# Patient Record
Sex: FEMALE | Race: BLACK OR AFRICAN AMERICAN | NOT HISPANIC OR LATINO | Employment: UNEMPLOYED | URBAN - METROPOLITAN AREA
[De-identification: names, ages, dates, MRNs, and addresses within clinical notes are randomized per-mention and may not be internally consistent; named-entity substitution may affect disease eponyms.]

---

## 2018-07-12 ENCOUNTER — HOSPITAL ENCOUNTER (EMERGENCY)
Facility: HOSPITAL | Age: 73
Discharge: HOME | End: 2018-07-12
Attending: EMERGENCY MEDICINE
Payer: MEDICARE

## 2018-07-12 VITALS
SYSTOLIC BLOOD PRESSURE: 190 MMHG | WEIGHT: 150 LBS | OXYGEN SATURATION: 98 % | BODY MASS INDEX: 24.99 KG/M2 | HEART RATE: 85 BPM | TEMPERATURE: 99 F | RESPIRATION RATE: 18 BRPM | HEIGHT: 65 IN | DIASTOLIC BLOOD PRESSURE: 110 MMHG

## 2018-07-12 DIAGNOSIS — Z87.39 HISTORY OF JOINT PAIN: ICD-10-CM

## 2018-07-12 DIAGNOSIS — M19.90 ARTHRITIS: ICD-10-CM

## 2018-07-12 DIAGNOSIS — M25.511 PAIN IN JOINT OF RIGHT SHOULDER: ICD-10-CM

## 2018-07-12 DIAGNOSIS — M25.542 ARTHRALGIA OF BOTH HANDS: Primary | ICD-10-CM

## 2018-07-12 DIAGNOSIS — M25.541 ARTHRALGIA OF BOTH HANDS: Primary | ICD-10-CM

## 2018-07-12 PROCEDURE — 99283 EMERGENCY DEPT VISIT LOW MDM: CPT

## 2018-07-12 PROCEDURE — 99283 EMERGENCY DEPT VISIT LOW MDM: CPT | Mod: ,,, | Performed by: EMERGENCY MEDICINE

## 2018-07-12 RX ORDER — IBUPROFEN 400 MG/1
200 TABLET ORAL EVERY 6 HOURS PRN
Qty: 28 TABLET | Refills: 2 | Status: SHIPPED | OUTPATIENT
Start: 2018-07-12 | End: 2018-07-18 | Stop reason: HOSPADM

## 2018-07-12 RX ORDER — AMLODIPINE BESYLATE 10 MG/1
10 TABLET ORAL DAILY
COMMUNITY
End: 2018-07-18 | Stop reason: SDUPTHER

## 2018-07-12 NOTE — ED PROVIDER NOTES
Encounter Date: 7/12/2018    SCRIBE #1 NOTE: I, Fern Alexander, am scribing for, and in the presence of,  Dr. Daniels. I have scribed the following portions of the note - the Resident attestation.       History     Chief Complaint   Patient presents with    Joint Pain     all my joints are hurting     72 yr old with PMH of HTN and arthritis presented with c/o joint pains all over, most severe in the hands, feet and right shoulder. Presented to [facility] a few weeks back with similar complaints, was told she had arthritis and given meloxicam. Hasn't been evaluated on an out patient basis.  Reports having some memory problems.  No fever/chills/fatigue/chest pain/SOB/  Bowel and bladder habits normal.           Review of patient's allergies indicates:  No Known Allergies  Past Medical History:   Diagnosis Date    Arthritis     Hypertension      Past Surgical History:   Procedure Laterality Date    BUNIONECTOMY       History reviewed. No pertinent family history.  Social History   Substance Use Topics    Smoking status: Never Smoker    Smokeless tobacco: Not on file    Alcohol use No     Review of Systems   Constitutional: Negative for chills, fatigue and fever.   HENT: Negative for trouble swallowing.    Eyes: Negative for visual disturbance.   Respiratory: Negative for cough and shortness of breath.    Cardiovascular: Negative for chest pain.   Gastrointestinal: Negative for abdominal pain.   Genitourinary: Negative for difficulty urinating.   Musculoskeletal: Positive for arthralgias, back pain and joint swelling.   Skin: Negative for color change.   Neurological: Negative for dizziness and light-headedness.   Psychiatric/Behavioral: Negative for agitation and confusion.       Physical Exam     Initial Vitals [07/12/18 1016]   BP Pulse Resp Temp SpO2   (!) 190/110 85 18 99.4 °F (37.4 °C) 98 %      MAP       --         Physical Exam    Nursing note and vitals reviewed.  Constitutional: No distress.   HENT:   Head:  "Normocephalic.   Eyes: Pupils are equal, round, and reactive to light.   Cardiovascular: Normal rate and regular rhythm.   Pulmonary/Chest: Breath sounds normal.   Abdominal: Soft. Bowel sounds are normal.   Musculoskeletal: She exhibits tenderness. She exhibits no edema.   Restricted ROM at right shoulder. Mild tenderness.  Swelling of bilateral MCPs. Mild tenderness, no erythema   Neurological: She is alert and oriented to person, place, and time.         ED Course   Procedures  Labs Reviewed - No data to display       Imaging Results    None          Medical Decision Making:   Initial Assessment:   72 yr old with HTN, ?arthritis presenting with diffuse joint pain. Also reports having memory problems. Has never been evaluated in clinic. Does not have a PCP.  Will need follow up with Internal Medicine, Rheumatology.  Will discharge on low dose NSAIDs and appropriate follow up              Scribe Attestation:   Scribe #1: I performed the above scribed service and the documentation accurately describes the services I performed. I attest to the accuracy of the note.    Attending Attestation:   Physician Attestation Statement for Resident:  As the supervising MD   Physician Attestation Statement: I have personally seen and examined this patient.   I agree with the above history. -: Presents to the ER for arthritis in many joints for "years" and difficulty remembering things for "years." Nothing is new or acute. Here today because she got her medical card. She is 72 and her family members are not aware that she has had Medicare for 7 years. Will refer her to PCP and rheumatology. Nothing acute.     As the supervising MD I agree with the above PE.    As the supervising MD I agree with the above treatment, course, plan, and disposition.  I have reviewed the following: old records at this facility.                       Clinical Impression:   The primary encounter diagnosis was Arthralgia of both hands. Diagnoses of Pain " in joint of right shoulder, Arthritis, and History of joint pain were also pertinent to this visit.                             Simeon Daniels MD  07/16/18 2326

## 2018-07-18 ENCOUNTER — OFFICE VISIT (OUTPATIENT)
Dept: INTERNAL MEDICINE | Facility: CLINIC | Age: 73
End: 2018-07-18
Payer: MEDICARE

## 2018-07-18 ENCOUNTER — LAB VISIT (OUTPATIENT)
Dept: LAB | Facility: HOSPITAL | Age: 73
End: 2018-07-18
Attending: INTERNAL MEDICINE
Payer: MEDICARE

## 2018-07-18 ENCOUNTER — TELEPHONE (OUTPATIENT)
Dept: INTERNAL MEDICINE | Facility: CLINIC | Age: 73
End: 2018-07-18

## 2018-07-18 ENCOUNTER — DOCUMENTATION ONLY (OUTPATIENT)
Dept: INTERNAL MEDICINE | Facility: CLINIC | Age: 73
End: 2018-07-18

## 2018-07-18 VITALS
WEIGHT: 159.19 LBS | DIASTOLIC BLOOD PRESSURE: 100 MMHG | BODY MASS INDEX: 26.52 KG/M2 | HEIGHT: 65 IN | SYSTOLIC BLOOD PRESSURE: 178 MMHG | OXYGEN SATURATION: 98 % | HEART RATE: 74 BPM

## 2018-07-18 DIAGNOSIS — M25.50 ARTHRALGIA, UNSPECIFIED JOINT: ICD-10-CM

## 2018-07-18 DIAGNOSIS — R30.0 DYSURIA: ICD-10-CM

## 2018-07-18 DIAGNOSIS — F32.A DEPRESSION, UNSPECIFIED DEPRESSION TYPE: ICD-10-CM

## 2018-07-18 DIAGNOSIS — M19.90 ARTHRITIS: ICD-10-CM

## 2018-07-18 DIAGNOSIS — I10 HYPERTENSION, UNSPECIFIED TYPE: Primary | ICD-10-CM

## 2018-07-18 DIAGNOSIS — Z12.11 COLON CANCER SCREENING: ICD-10-CM

## 2018-07-18 DIAGNOSIS — F41.9 ANXIETY: ICD-10-CM

## 2018-07-18 DIAGNOSIS — Z12.31 ENCOUNTER FOR SCREENING MAMMOGRAM FOR BREAST CANCER: ICD-10-CM

## 2018-07-18 LAB
BACTERIA #/AREA URNS AUTO: ABNORMAL /HPF
BILIRUB UR QL STRIP: NEGATIVE
CLARITY UR REFRACT.AUTO: CLEAR
COLOR UR AUTO: ABNORMAL
GLUCOSE UR QL STRIP: NEGATIVE
HGB UR QL STRIP: NEGATIVE
KETONES UR QL STRIP: NEGATIVE
LEUKOCYTE ESTERASE UR QL STRIP: ABNORMAL
MICROSCOPIC COMMENT: ABNORMAL
NITRITE UR QL STRIP: NEGATIVE
PH UR STRIP: 8 [PH] (ref 5–8)
PROT UR QL STRIP: NEGATIVE
RBC #/AREA URNS AUTO: 3 /HPF (ref 0–4)
SP GR UR STRIP: 1.01 (ref 1–1.03)
SQUAMOUS #/AREA URNS AUTO: 2 /HPF
URN SPEC COLLECT METH UR: ABNORMAL
UROBILINOGEN UR STRIP-ACNC: NEGATIVE EU/DL
WBC #/AREA URNS AUTO: 4 /HPF (ref 0–5)

## 2018-07-18 PROCEDURE — 99204 OFFICE O/P NEW MOD 45 MIN: CPT | Mod: S$PBB,,, | Performed by: INTERNAL MEDICINE

## 2018-07-18 PROCEDURE — 99999 PR PBB SHADOW E&M-EST. PATIENT-LVL V: CPT | Mod: PBBFAC,,, | Performed by: INTERNAL MEDICINE

## 2018-07-18 PROCEDURE — 90670 PCV13 VACCINE IM: CPT | Mod: PBBFAC

## 2018-07-18 PROCEDURE — 87086 URINE CULTURE/COLONY COUNT: CPT

## 2018-07-18 PROCEDURE — 81001 URINALYSIS AUTO W/SCOPE: CPT

## 2018-07-18 PROCEDURE — 99215 OFFICE O/P EST HI 40 MIN: CPT | Mod: PBBFAC,25 | Performed by: INTERNAL MEDICINE

## 2018-07-18 RX ORDER — AMLODIPINE BESYLATE 10 MG/1
10 TABLET ORAL DAILY
Qty: 90 TABLET | Refills: 3 | Status: SHIPPED | OUTPATIENT
Start: 2018-07-18 | End: 2019-02-19 | Stop reason: SDUPTHER

## 2018-07-18 RX ORDER — SERTRALINE HYDROCHLORIDE 50 MG/1
50 TABLET, FILM COATED ORAL DAILY
Qty: 90 TABLET | Refills: 1 | Status: SHIPPED | OUTPATIENT
Start: 2018-07-18 | End: 2019-02-13 | Stop reason: SDUPTHER

## 2018-07-18 NOTE — PROGRESS NOTES
Subjective:       Patient ID: Tran Bradley is a 72 y.o. female.    Chief Complaint: Follow-up   This is a 72-year-old who presents today for an appointment to establish.  She is brought in by her sister Rachelle.  Her sister reports that she was previously living in [place] not doing a very good job of taking care of herself so she moved to [place] to be with her spinal family members living with her son and her sister Rachelle is taking care of her trying to encourage her to take care of herself and she will be bringing her to her appointments.  She reports has had problems with arthritis and will use an occasional anti-inflammatory or Tylenol for her symptoms mostly in the hands the knees at times will bother her as well.  She has been depressed at times where she sits and stares and does not want to get up and do things her sister relates most of the history but missed  also reports agrees that she has had some depression recently and had been on medication in the past would be agreeable to restarting.  She has history of high blood pressure for which she was taking amlodipine was given to her intermittently by the various ERs but she has not followed up consistently with anyone was awaiting her insurance as well according to her sister.  She has had problems with arthritis uses a walker has had previous foot surgery and some foot deformity which makes it difficult her to get around at times.  Sister reports he has been more fearful of falling as well.  She denies headaches or chest pain has not had a mammogram or other health maintenance in some time either.  She recently went to the ER for her arthritis    HPI  Review of Systems   Constitutional: Negative for fever.   Respiratory: Negative for cough, shortness of breath and wheezing.    Cardiovascular: Negative for chest pain and palpitations.   Gastrointestinal: Negative for abdominal pain and constipation.   Musculoskeletal: Positive for arthralgias.        Hand  "kness mostly    Neurological: Negative for dizziness.       Objective:     Blood pressure (!) 178/100, pulse 74, height 5' 5" (1.651 m), weight 72.2 kg (159 lb 2.8 oz), SpO2 98 %.    Physical Exam   Constitutional: No distress.   HENT:   Head: Normocephalic.   Mouth/Throat: Oropharynx is clear and moist.   Eyes: No scleral icterus.   Neck: Neck supple.   Cardiovascular: Normal rate, regular rhythm and normal heart sounds.  Exam reveals no gallop and no friction rub.    No murmur heard.  Pulmonary/Chest: Effort normal and breath sounds normal. No respiratory distress.   Breast  sebacious cyst left upper breast     Abdominal: Soft. Bowel sounds are normal. She exhibits no mass. There is no tenderness.   Musculoskeletal: She exhibits no edema.   Foot defomrity  Crepitus knees  osteoarhttis bony deformity hands    Neurological: She is alert.   Skin: No erythema.   Psychiatric: She has a normal mood and affect.   Vitals reviewed.      Assessment:       1. Hypertension, unspecified type    2. Encounter for screening mammogram for breast cancer    3. Arthralgia, unspecified joint    4. Colon cancer screening    5. Arthritis    6. Dysuria    7. Depression, unspecified depression type    8. Anxiety        Plan:       Tran was seen today for follow-up.    Diagnoses and all orders for this visit:    Hypertension, unspecified type  Uncontrolled noncompliant encouraged resumption of medication she was previously taking amlodipine at 10 mg I will have her resume this will review her blood work and check renal function etc and consider additional adjustment as needed over time encouraged home blood pressure monitoring low-sodium  -     Basic metabolic panel; Future  -     CBC auto differential; Future  -     Hepatic function panel; Future  -     Lipid panel; Future  -     TSH; Future    Encounter for screening mammogram for breast cancer  -     Mammo Digital Screening Bilat with CAD; Future    Arthralgia, unspecified " joint  Discussed consider rheumatology will schedule levels and review  Await bp control discussed   -     Rheumatoid factor; Future  -     Cyclic citrul peptide antibody, IgG; Future    Colon cancer screening  -     Fecal Immunochemical Test (iFOBT); Future  To start she will consider colonoscopy in the future     Arthritis  More significant in her knees and hands x-rays  She has walker continued fall precuations   Discussed rheumatology appt additional treatment  As indicated await bp control   -     X-ray Knee Ortho Bilateral; Future  -     X-Ray Hand 3 View Bilateral; Future    Dysuria  Intermittent she had a recent UTI treated outlying facility earlier this year will plan to check and review  -     Urinalysis; Future  -     Urine culture; Future      -     amLODIPine (NORVASC) 10 MG tablet; Take 1 tablet (10 mg total) by mouth once daily.    Depression  And anxiety with recent move etc   Plan start   -     sertraline (ZOLOFT) 50 MG tablet; Take 1 tablet (50 mg total) by mouth once daily.    Memory issues consider neruology discussed    prevnar 13 today    Follow up 3-4 weeks bp recheck back on mediation and go from there

## 2018-07-19 LAB — BACTERIA UR CULT: NO GROWTH

## 2018-07-20 ENCOUNTER — TELEPHONE (OUTPATIENT)
Dept: INTERNAL MEDICINE | Facility: CLINIC | Age: 73
End: 2018-07-20

## 2018-07-20 DIAGNOSIS — M15.9 PRIMARY OSTEOARTHRITIS INVOLVING MULTIPLE JOINTS: Primary | ICD-10-CM

## 2018-07-20 NOTE — TELEPHONE ENCOUNTER
Discussed with sister  As requested  Labs acceptable  Lipids up and will consider satin at her follow up   Discussed  She missed xrays and can call to reschedule  She would like to wait until follow up  Rheumatology referral placed number provided to call and schedule  Reached sister awilda  at second listed number   [phone]

## 2018-07-24 ENCOUNTER — LAB VISIT (OUTPATIENT)
Dept: LAB | Facility: HOSPITAL | Age: 73
End: 2018-07-24
Attending: INTERNAL MEDICINE
Payer: MEDICARE

## 2018-07-24 DIAGNOSIS — Z12.11 COLON CANCER SCREENING: ICD-10-CM

## 2018-07-24 LAB — HEMOCCULT STL QL IA: NEGATIVE

## 2018-07-24 PROCEDURE — 82274 ASSAY TEST FOR BLOOD FECAL: CPT

## 2018-08-08 ENCOUNTER — HOSPITAL ENCOUNTER (OUTPATIENT)
Dept: RADIOLOGY | Facility: HOSPITAL | Age: 73
Discharge: HOME | End: 2018-08-08
Attending: INTERNAL MEDICINE
Payer: MEDICARE

## 2018-08-08 DIAGNOSIS — Z12.31 ENCOUNTER FOR SCREENING MAMMOGRAM FOR BREAST CANCER: ICD-10-CM

## 2018-08-08 PROCEDURE — 77067 SCR MAMMO BI INCL CAD: CPT | Mod: 26,,, | Performed by: RADIOLOGY

## 2018-08-08 PROCEDURE — 77067 SCR MAMMO BI INCL CAD: CPT | Mod: TC

## 2018-10-10 ENCOUNTER — HOSPITAL ENCOUNTER (OUTPATIENT)
Dept: RADIOLOGY | Facility: HOSPITAL | Age: 73
Discharge: HOME | End: 2018-10-10
Attending: INTERNAL MEDICINE
Payer: MEDICARE

## 2018-10-10 DIAGNOSIS — M19.90 ARTHRITIS: ICD-10-CM

## 2018-10-10 PROCEDURE — 73130 X-RAY EXAM OF HAND: CPT | Mod: 50,TC

## 2018-10-10 PROCEDURE — 73130 X-RAY EXAM OF HAND: CPT | Mod: 26,59,LT, | Performed by: RADIOLOGY

## 2018-10-10 PROCEDURE — 73562 X-RAY EXAM OF KNEE 3: CPT | Mod: TC,50

## 2018-10-10 PROCEDURE — 73130 X-RAY EXAM OF HAND: CPT | Mod: 26,RT,, | Performed by: RADIOLOGY

## 2018-10-10 PROCEDURE — 73562 X-RAY EXAM OF KNEE 3: CPT | Mod: 26,RT,, | Performed by: RADIOLOGY

## 2018-10-12 ENCOUNTER — TELEPHONE (OUTPATIENT)
Dept: INTERNAL MEDICINE | Facility: CLINIC | Age: 73
End: 2018-10-12

## 2018-10-12 NOTE — TELEPHONE ENCOUNTER
----- Message from Jocelin Vargas MD sent at 10/12/2018  8:55 AM CDT -----  Call and notify pt she had arthrits changes in her knees   And hands and wrists  she should reschedulle her follow up for further evaluation

## 2018-10-12 NOTE — TELEPHONE ENCOUNTER
Spoke w pt son; informed of MD notes. Pt verbalized understanding and will let his Aunt know to schedule follow up appointment for his mother.

## 2018-10-12 NOTE — TELEPHONE ENCOUNTER
She can take tylenol otc   And should follow up as previously recommended  For further evaluation

## 2018-11-14 ENCOUNTER — PATIENT OUTREACH (OUTPATIENT)
Dept: ADMINISTRATIVE | Facility: HOSPITAL | Age: 73
End: 2018-11-14

## 2018-11-14 DIAGNOSIS — Z78.0 ASYMPTOMATIC MENOPAUSAL STATE: ICD-10-CM

## 2018-11-14 DIAGNOSIS — Z13.820 OSTEOPOROSIS SCREENING: Primary | ICD-10-CM

## 2018-11-14 NOTE — PROGRESS NOTES
Spoke with pt's son , reminded of upcoming PCP visit 11-19-18 and need for Dexa Scan ( Scheduled 1-31-19)

## 2018-11-19 ENCOUNTER — OFFICE VISIT (OUTPATIENT)
Dept: INTERNAL MEDICINE | Facility: CLINIC | Age: 73
End: 2018-11-19
Payer: MEDICARE

## 2018-11-19 ENCOUNTER — TELEPHONE (OUTPATIENT)
Dept: INTERNAL MEDICINE | Facility: CLINIC | Age: 73
End: 2018-11-19

## 2018-11-19 ENCOUNTER — OUTPATIENT CASE MANAGEMENT (OUTPATIENT)
Dept: ADMINISTRATIVE | Facility: OTHER | Age: 73
End: 2018-11-19

## 2018-11-19 ENCOUNTER — LAB VISIT (OUTPATIENT)
Dept: LAB | Facility: HOSPITAL | Age: 73
End: 2018-11-19
Attending: INTERNAL MEDICINE
Payer: MEDICARE

## 2018-11-19 VITALS — SYSTOLIC BLOOD PRESSURE: 148 MMHG | HEART RATE: 79 BPM | OXYGEN SATURATION: 99 % | DIASTOLIC BLOOD PRESSURE: 78 MMHG

## 2018-11-19 DIAGNOSIS — N39.9 URINARY DISORDER: ICD-10-CM

## 2018-11-19 DIAGNOSIS — F41.9 ANXIETY: ICD-10-CM

## 2018-11-19 DIAGNOSIS — I10 HYPERTENSION, UNSPECIFIED TYPE: Primary | ICD-10-CM

## 2018-11-19 DIAGNOSIS — M19.90 OSTEOARTHRITIS, UNSPECIFIED OSTEOARTHRITIS TYPE, UNSPECIFIED SITE: ICD-10-CM

## 2018-11-19 DIAGNOSIS — R41.3 MEMORY LOSS: ICD-10-CM

## 2018-11-19 DIAGNOSIS — F32.A DEPRESSION, UNSPECIFIED DEPRESSION TYPE: ICD-10-CM

## 2018-11-19 LAB
BACTERIA #/AREA URNS AUTO: ABNORMAL /HPF
BILIRUB UR QL STRIP: NEGATIVE
CAOX CRY UR QL COMP ASSIST: ABNORMAL
CLARITY UR REFRACT.AUTO: ABNORMAL
COLOR UR AUTO: YELLOW
GLUCOSE UR QL STRIP: NEGATIVE
HGB UR QL STRIP: NEGATIVE
KETONES UR QL STRIP: ABNORMAL
LEUKOCYTE ESTERASE UR QL STRIP: ABNORMAL
MICROSCOPIC COMMENT: ABNORMAL
NITRITE UR QL STRIP: NEGATIVE
PH UR STRIP: 5 [PH] (ref 5–8)
PROT UR QL STRIP: NEGATIVE
RBC #/AREA URNS AUTO: 2 /HPF (ref 0–4)
SP GR UR STRIP: >=1.03 (ref 1–1.03)
SQUAMOUS #/AREA URNS AUTO: 6 /HPF
URN SPEC COLLECT METH UR: ABNORMAL
WBC #/AREA URNS AUTO: 27 /HPF (ref 0–5)

## 2018-11-19 PROCEDURE — 99999 PR PBB SHADOW E&M-EST. PATIENT-LVL IV: CPT | Mod: PBBFAC,,, | Performed by: INTERNAL MEDICINE

## 2018-11-19 PROCEDURE — 90662 IIV NO PRSV INCREASED AG IM: CPT | Mod: PBBFAC

## 2018-11-19 PROCEDURE — 99214 OFFICE O/P EST MOD 30 MIN: CPT | Mod: PBBFAC,25 | Performed by: INTERNAL MEDICINE

## 2018-11-19 PROCEDURE — 99214 OFFICE O/P EST MOD 30 MIN: CPT | Mod: S$PBB,,, | Performed by: INTERNAL MEDICINE

## 2018-11-19 PROCEDURE — 81001 URINALYSIS AUTO W/SCOPE: CPT

## 2018-11-19 RX ORDER — DICLOFENAC SODIUM 10 MG/G
2 GEL TOPICAL DAILY PRN
Qty: 100 G | Refills: 6 | Status: SHIPPED | OUTPATIENT
Start: 2018-11-19

## 2018-11-19 RX ORDER — AMOXICILLIN 500 MG/1
500 TABLET, FILM COATED ORAL 2 TIMES DAILY
Qty: 20 TABLET | Refills: 0 | Status: SHIPPED | OUTPATIENT
Start: 2018-11-19 | End: 2018-11-29

## 2018-11-19 RX ORDER — ROSUVASTATIN CALCIUM 10 MG/1
10 TABLET, COATED ORAL DAILY
Qty: 90 TABLET | Refills: 1 | Status: SHIPPED | OUTPATIENT
Start: 2018-11-19 | End: 2019-02-19 | Stop reason: SDUPTHER

## 2018-11-19 NOTE — TELEPHONE ENCOUNTER
----- Message from Catina Mckeon sent at 11/19/2018 12:17 PM CST -----  Thank you for the referral.  Patient has been assigned to Jocelin Malcolm LMSW for low risk screening for Outpatient Case Management.      Reason for referral: Hypertension, unspecified type  Osteoarthritis, unspecified osteoarthritis type, unspecified site  Memory loss     Please contact [facility] at [phone] with any questions.     Thank you,     Catina Mckeon, [facility]  [phone]

## 2018-11-19 NOTE — PROGRESS NOTES
Subjective:       Patient ID: Tran Bradley is a 72 y.o. female.    Chief Complaint: Follow-up   This is a 72-year-old who presents today for follow-up.  She is brought in by her sister Rachelle who moved her sister to [place] so she could help take care of her she is living with her son but Rachelle reports she has been helping with her appointments and trying to encourage her to take her medications which she has been taking on and off.  Since moving back to [place] she has been concerned about her memory since she has been seeing her more consistently she seems to be depressed at times and has been taking her Zoloft and her amlodipine for her blood pressure but is not taking it on a consistent basis she tries to do a pillbox and other things to help remind her  Reports her son is not tracking her medications but she was not sure how compliant she was previously when she lived in [place].  She was worried that her urine was a bit 0 2 wrist but patient is not complaining of back pain or fevers.  She continues to have difficulty getting around uses a wheelchair walker.  Her knees tend to be giving her the most trouble although she does have some arthritis in her hands and wrists    HPI  Review of Systems   Musculoskeletal:        Arthritis knees    Neurological:        Memory loss       Objective:     Blood pressure (!) 148/78, pulse 79, SpO2 99 %.    Physical Exam   Constitutional: No distress.   HENT:   Head: Normocephalic.   Mouth/Throat: Oropharynx is clear and moist.   Eyes: No scleral icterus.   Neck: Neck supple.   Cardiovascular: Normal rate, regular rhythm and normal heart sounds. Exam reveals no gallop and no friction rub.   No murmur heard.  Pulmonary/Chest: Effort normal and breath sounds normal. No respiratory distress.   Abdominal: Soft. Bowel sounds are normal. She exhibits no mass. There is no tenderness.   Musculoskeletal: She exhibits no edema.   Knees creptisu  Arthritis changes hands     Neurological: She is alert.   Skin: No erythema.   Psychiatric: She has a normal mood and affect.   Vitals reviewed.      Assessment:       1. Hypertension, unspecified type    2. Osteoarthritis, unspecified osteoarthritis type, unspecified site    3. Depression, unspecified depression type    4. Anxiety    5. Memory loss    6. Urinary disorder        Plan:       Tran was seen today for follow-up.    Diagnoses and all orders for this visit:    Hypertension, unspecified type  discussed with patient and family  Recommend use of pillbox and calling or reminding sister maybe needs to assist taking over her medication management to encourage compliance discussed is additional resources for home ADLs sister would like to consider somebody to help bathe her  -     Basic metabolic panel; Future  -     Hepatic function panel; Future  -     Lipid panel; Future  -     Ambulatory referral to Outpatient Case Management    Osteoarthritis, unspecified osteoarthritis type, unspecified site  Discussed continue arthritis in her knees she has been using the Tylenol trial of diclofenac consider ortho possible injection   -     Ambulatory consult to Orthopedics  -     Ambulatory referral to Outpatient Case Management    Depression anxiety, unspecified depression type  Resume Zoloft she has refills plans to get at pharmacy       Memory loss  Pt mms test 20/30 discussed with sister  Who would like neurology appt   -     Ambulatory referral to Neurology  -     Ambulatory referral to Outpatient Case Management    Urinary disorder  Check urine   -     Urinalysis; Future    hyperlipidmia discussed statin strt   -     rosuvastatin (CRESTOR) 10 MG tablet; Take 1 tablet (10 mg total) by mouth once daily.    osteoarthirtis tial   -     diclofenac sodium (VOLTAREN) 1 % Gel; Apply 2 g topically daily as needed.    Flu shot recommended

## 2018-11-19 NOTE — PROGRESS NOTES
Thank you for the referral.  Patient has been assigned to Jocelin Malcolm LMSW for low risk screening for Outpatient Case Management.     Reason for referral: Hypertension, unspecified type  Osteoarthritis, unspecified osteoarthritis type, unspecified site  Memory loss    Please contact [facility] at ext. [phone] with any questions.    Thank you,    Catina Mckeon, [facility]  Outpatient [facility] Mgmt.  [phone]

## 2018-11-20 ENCOUNTER — OUTPATIENT CASE MANAGEMENT (OUTPATIENT)
Dept: ADMINISTRATIVE | Facility: OTHER | Age: 73
End: 2018-11-20

## 2018-11-20 NOTE — TELEPHONE ENCOUNTER
----- Message from Jocelin Malcolm LMSW sent at 11/20/2018  3:57 PM CST -----  This LMSW received a referral on the above patient. This LMSW provided patient/caregiver with the following resource: COA, Medicaid Long Term Criteria, Hired Caregiver List , Senior Resource Guide, [facility], [facility], [facility], [facility] . The resource can be located within [facility] under the Care category.    Thank you for the referral,    Jocelin Malcolm LMSW

## 2018-11-20 NOTE — PROGRESS NOTES
This LMSW received a referral on the above patient.   Reason for referral:Hypertension, unspecified type   Osteoarthritis, unspecified osteoarthritis type, unspecified site   Memory loss   Name of the community resource that was provided:[facility], Medicaid Long Term Criteria, Hired Caregiver List , Senior Resource Guide, [facility], [facility], [facility], [facility]   Resource given to: Patient Caregiver (Sister Rachelle) via [place] Mail and Telephone    This LMSW completed assessment with patient caregiver. Caregiver reports patient resides with her son who assist with shelter. Caregiver reports she assist patient with bathing and additional miscellaneous task. Caregiver reports patient needs assistance with  ADL's and no hired help available nor is patient able to pay for services. LMSW sent a referral to [facility] to assist with bathing and Incontinence supplies. . LMSW also provided caregiver with information on Long Term Medicaid services. This program will assist with additional resources.   Caregiver reports patient ambulates with a walker. Caregiver reports patient may need to be placed in a facility as patient has memory issues. Caregiver reports a referral was placed for neurology for a consult. LMSW encouraged caregiver to follow up with PCP office if she has not heard from neurology within two weeks. Caregiver denied patient needs assistance with food, shelter, medication or medical. Caregiver reports no POA on file, but son makes decision. Caregiver reports no additional support in the home. Caregiver reports patient has two daughters that live in [place] that aren't physically involved in patient care. Caregiver reports she provides transportation to and from appointments. Caregiver reports patient sometimes forgets to take her medication. Caregiver reports a pill box is set up and son usually administer medication, however Caregiver uncertain if patient is  taking medication. LMSW encouraged Caregiver to assist, but caregiver reports she is not available every day of the week. Patient may benefit from pill packing in the future. Caregiver reports she provides transportation to and from appointment. Caregiver open to receiving additional information on Adult Care to assist with day time care. LMSW mailed all resources and provided her contact information for any additional needs.

## 2018-12-06 ENCOUNTER — HOSPITAL ENCOUNTER (EMERGENCY)
Facility: HOSPITAL | Age: 73
Discharge: HOME | End: 2018-12-06
Attending: EMERGENCY MEDICINE
Payer: MEDICARE

## 2018-12-06 VITALS
DIASTOLIC BLOOD PRESSURE: 86 MMHG | SYSTOLIC BLOOD PRESSURE: 125 MMHG | TEMPERATURE: 98 F | OXYGEN SATURATION: 95 % | HEIGHT: 65 IN | RESPIRATION RATE: 18 BRPM | WEIGHT: 140 LBS | HEART RATE: 72 BPM | BODY MASS INDEX: 23.32 KG/M2

## 2018-12-06 DIAGNOSIS — R11.2 NON-INTRACTABLE VOMITING WITH NAUSEA, UNSPECIFIED VOMITING TYPE: ICD-10-CM

## 2018-12-06 DIAGNOSIS — K80.20 GALLSTONES: Primary | ICD-10-CM

## 2018-12-06 LAB
ALBUMIN SERPL BCP-MCNC: 3.9 G/DL
ALP SERPL-CCNC: 86 U/L
ALT SERPL W/O P-5'-P-CCNC: 11 U/L
ANION GAP SERPL CALC-SCNC: 15 MMOL/L
AST SERPL-CCNC: 20 U/L
BACTERIA #/AREA URNS AUTO: NORMAL /HPF
BASOPHILS # BLD AUTO: 0.04 K/UL
BASOPHILS NFR BLD: 0.4 %
BILIRUB SERPL-MCNC: 0.5 MG/DL
BILIRUB UR QL STRIP: NEGATIVE
BUN SERPL-MCNC: 13 MG/DL
CALCIUM SERPL-MCNC: 10 MG/DL
CHLORIDE SERPL-SCNC: 107 MMOL/L
CLARITY UR REFRACT.AUTO: ABNORMAL
CO2 SERPL-SCNC: 19 MMOL/L
COLOR UR AUTO: YELLOW
CREAT SERPL-MCNC: 0.9 MG/DL
DIFFERENTIAL METHOD: ABNORMAL
EOSINOPHIL # BLD AUTO: 0 K/UL
EOSINOPHIL NFR BLD: 0.3 %
ERYTHROCYTE [DISTWIDTH] IN BLOOD BY AUTOMATED COUNT: 13.2 %
EST. GFR  (AFRICAN AMERICAN): >60 ML/MIN/1.73 M^2
EST. GFR  (NON AFRICAN AMERICAN): >60 ML/MIN/1.73 M^2
GLUCOSE SERPL-MCNC: 117 MG/DL
GLUCOSE UR QL STRIP: NEGATIVE
HCT VFR BLD AUTO: 45 %
HGB BLD-MCNC: 14.1 G/DL
HGB UR QL STRIP: NEGATIVE
HYALINE CASTS UR QL AUTO: 1 /LPF
IMM GRANULOCYTES # BLD AUTO: 0.05 K/UL
IMM GRANULOCYTES NFR BLD AUTO: 0.5 %
KETONES UR QL STRIP: ABNORMAL
LEUKOCYTE ESTERASE UR QL STRIP: NEGATIVE
LIPASE SERPL-CCNC: 12 U/L
LYMPHOCYTES # BLD AUTO: 1.4 K/UL
LYMPHOCYTES NFR BLD: 13.6 %
MCH RBC QN AUTO: 26.5 PG
MCHC RBC AUTO-ENTMCNC: 31.3 G/DL
MCV RBC AUTO: 84 FL
MICROSCOPIC COMMENT: NORMAL
MONOCYTES # BLD AUTO: 0.6 K/UL
MONOCYTES NFR BLD: 5.7 %
NEUTROPHILS # BLD AUTO: 8 K/UL
NEUTROPHILS NFR BLD: 79.5 %
NITRITE UR QL STRIP: NEGATIVE
NRBC BLD-RTO: 0 /100 WBC
PH UR STRIP: 5 [PH] (ref 5–8)
PLATELET # BLD AUTO: 288 K/UL
PMV BLD AUTO: 9.8 FL
POTASSIUM SERPL-SCNC: 3.8 MMOL/L
PROT SERPL-MCNC: 8.3 G/DL
PROT UR QL STRIP: NEGATIVE
RBC # BLD AUTO: 5.33 M/UL
SODIUM SERPL-SCNC: 141 MMOL/L
SP GR UR STRIP: 1.02 (ref 1–1.03)
SQUAMOUS #/AREA URNS AUTO: 1 /HPF
TROPONIN I SERPL DL<=0.01 NG/ML-MCNC: <0.006 NG/ML
URN SPEC COLLECT METH UR: ABNORMAL
WBC # BLD AUTO: 10 K/UL
WBC #/AREA URNS AUTO: 1 /HPF (ref 0–5)

## 2018-12-06 PROCEDURE — 85025 COMPLETE CBC W/AUTO DIFF WBC: CPT

## 2018-12-06 PROCEDURE — P9612 CATHETERIZE FOR URINE SPEC: HCPCS

## 2018-12-06 PROCEDURE — 80053 COMPREHEN METABOLIC PANEL: CPT

## 2018-12-06 PROCEDURE — 99284 EMERGENCY DEPT VISIT MOD MDM: CPT | Mod: 25

## 2018-12-06 PROCEDURE — 96360 HYDRATION IV INFUSION INIT: CPT

## 2018-12-06 PROCEDURE — 84484 ASSAY OF TROPONIN QUANT: CPT

## 2018-12-06 PROCEDURE — 25000003 PHARM REV CODE 250: Performed by: EMERGENCY MEDICINE

## 2018-12-06 PROCEDURE — 96361 HYDRATE IV INFUSION ADD-ON: CPT

## 2018-12-06 PROCEDURE — 81001 URINALYSIS AUTO W/SCOPE: CPT

## 2018-12-06 PROCEDURE — 83690 ASSAY OF LIPASE: CPT

## 2018-12-06 PROCEDURE — 99285 EMERGENCY DEPT VISIT HI MDM: CPT | Mod: ,,, | Performed by: EMERGENCY MEDICINE

## 2018-12-06 RX ORDER — ONDANSETRON 4 MG/1
4 TABLET, ORALLY DISINTEGRATING ORAL ONCE
Qty: 16 TABLET | Refills: 0 | Status: SHIPPED | OUTPATIENT
Start: 2018-12-06 | End: 2018-12-06

## 2018-12-06 RX ADMIN — SODIUM CHLORIDE, SODIUM LACTATE, POTASSIUM CHLORIDE, AND CALCIUM CHLORIDE 1000 ML: .6; .31; .03; .02 INJECTION, SOLUTION INTRAVENOUS at 07:12

## 2018-12-07 NOTE — ED PROVIDER NOTES
Encounter Date: 12/6/2018    SCRIBE #1 NOTE: I, Simeon Herrmann, am scribing for, and in the presence of, Gerardo Colon MD.       History     Chief Complaint   Patient presents with    Weakness     generalized weakness, abd pain, N/V x few days     72 year old female with PMHx of anxiety and HTN presents to the ED via EMS with complaint of intermittent epigastric abd pain with associated nausea and vomiting that started one week ago, pain resolved at this time. No exacerbating or mitigating factors. She denies experiencing similar symptoms in the past. Patient endorses difficulty swallowing x 1 week however she denies fever, chills, CP, SOB, recent changes in BM, dysuria, or any other complaints at this time.      The history is provided by the patient.   Abdominal Pain   The current episode started several days ago. The problem has been resolved. The abdominal pain is located in the epigastric region. The abdominal pain does not radiate. The abdominal pain is relieved by nothing. The other symptoms of the illness include nausea and vomiting. The other symptoms of the illness do not include fever, melena, shortness of breath, diarrhea or dysuria.   Symptoms associated with the illness do not include chills, constipation or back pain.     Review of patient's allergies indicates:  No Known Allergies  Past Medical History:   Diagnosis Date    Anxiety     Arthritis     Depression     Hypertension      Past Surgical History:   Procedure Laterality Date    BUNIONECTOMY      bilateral      Family History   Problem Relation Age of Onset    Hypertension Mother     Heart disease Mother     Hypertension Father     Asthma Father     Heart disease Father     Hypertension Sister     COPD Brother     Heart disease Brother      Social History     Tobacco Use    Smoking status: Never Smoker   Substance Use Topics    Alcohol use: No    Drug use: No     Review of Systems   Constitutional: Negative for chills and  fever.   HENT: Positive for trouble swallowing. Negative for sore throat.    Respiratory: Negative for shortness of breath.    Cardiovascular: Negative for chest pain.   Gastrointestinal: Positive for abdominal pain, nausea and vomiting. Negative for constipation, diarrhea and melena.   Genitourinary: Negative for dysuria.   Musculoskeletal: Negative for back pain.   Skin: Negative for rash.   Neurological: Negative for weakness.   Hematological: Does not bruise/bleed easily.       Physical Exam     Initial Vitals [12/06/18 1824]   BP Pulse Resp Temp SpO2   118/70 72 20 98 °F (36.7 °C) 98 %      MAP       --         Physical Exam    Vitals reviewed.  Constitutional: No distress.   72 year old -American female in no acute distress.   HENT:   Head: Normocephalic and atraumatic.   Mouth/Throat: No oral lesions.   Edentulous without acute intraoral injury noted.   Eyes: EOM are normal. Pupils are equal, round, and reactive to light.   Neck: No tracheal deviation present. No JVD present.   Cardiovascular: Normal rate, regular rhythm, normal heart sounds and intact distal pulses. Exam reveals no gallop and no friction rub.    No murmur heard.  Pulmonary/Chest: Breath sounds normal. No stridor. No respiratory distress.   Abdominal: Soft. She exhibits no distension. There is tenderness (mild) in the epigastric area.   Musculoskeletal: Normal range of motion. She exhibits no edema.   Moves all 4 extremities.   Neurological: She is alert and oriented to person, place, and time.   Skin: Skin is warm and dry.   Psychiatric: Her behavior is normal. Thought content normal.         ED Course   Procedures  Labs Reviewed   CBC W/ AUTO DIFFERENTIAL - Abnormal; Notable for the following components:       Result Value    MCH 26.5 (*)     MCHC 31.3 (*)     Gran # (ANC) 8.0 (*)     Immature Grans (Abs) 0.05 (*)     Gran% 79.5 (*)     Lymph% 13.6 (*)     All other components within normal limits   COMPREHENSIVE METABOLIC PANEL -  Abnormal; Notable for the following components:    CO2 19 (*)     Glucose 117 (*)     All other components within normal limits   URINALYSIS, REFLEX TO URINE CULTURE - Abnormal; Notable for the following components:    Appearance, UA Hazy (*)     Ketones, UA 1+ (*)     All other components within normal limits    Narrative:     Preferred Collection Type->Urine, Clean Catch  WHITE TUBE   LIPASE   TROPONIN I   TROPONIN I    Narrative:     add on TROP #[id number] per Gerardo Colon MD @ 20:33  12/06/2018    URINALYSIS MICROSCOPIC    Narrative:     Preferred Collection Type->Urine, Clean Catch  WHITE TUBE     EKG Readings: (Independently Interpreted)   Initial Reading: No STEMI. Rhythm: Normal Sinus Rhythm. Heart Rate: 69. ST Segment Depression: I. T Waves Flipped: V6. Axis: Normal.       Imaging Results          US Abdomen Limited (Final result)     Abnormal  Result time 12/06/18 20:52:27    Final result by Marciano Jacobsen MD (12/06/18 20:52:27)                 Impression:      Cholelithiasis and gallbladder wall thickening with negative sonographic Pathak's sign.  Clinical correlation is needed.  If there is clinical concern for acute cholecystitis, HIDA scan can be considered.    This report was flagged in Epic as abnormal.    Electronically signed by resident: Sunny Cherry  Date:    12/06/2018  Time:    20:38    Electronically signed by: Marciano Jacobsen MD  Date:    12/06/2018  Time:    20:52             Narrative:    EXAMINATION:  US ABDOMEN LIMITED    CLINICAL HISTORY:  upper abdominal pain    TECHNIQUE:  Limited ultrasound of the right upper quadrant of the abdomen (including pancreas, liver, gallbladder, common bile duct, and right kidney) was performed.    COMPARISON:  None.    FINDINGS:  Liver: Normal in size, measuring 16.4 cm. Homogeneous echotexture. No focal hepatic lesions.    Gallbladder: A single 2.6 cm gallstone is seen within the gallbladder.  Assessment of stone mobility is suboptimal due to  patient immobility.  Gallbladder wall is thickened measuring up to 5 mm.  No pericholecystic fluid.  Negative sonographic Pathak sign.    Biliary system: The common duct is not dilated, measuring 4 mm.  No intrahepatic ductal dilatation.    The spleen measures 10.0 x 2.9 cm and is unremarkable.  Visualized portion the pancreas is unremarkable.    Miscellaneous: No upper abdominal ascites.                                 Medical Decision Making:   Differential Diagnosis:   DDx includes but is not limited to: gastroenteritis, biliary colic, acute cholecystitis, CASEY  Independently Interpreted Test(s):   I have ordered and independently interpreted EKG Reading(s) - see prior notes  Clinical Tests:   Lab Tests: Ordered and Reviewed  Radiological Study: Ordered and Reviewed  Medical Tests: Ordered and Reviewed              Attending Attestation:           Physician Attestation for Scribe:      Comments: I, Dr. Gerardo Colon, personally performed the services described in this documentation. All medical record entries made by the scribe were at my direction and in my presence.  I have reviewed the chart and agree that the record reflects my personal performance and is accurate and complete. Gerardo Colon MD.  8:07 PM 12/06/2018      Attending ED Notes:   Ultrasound of the right upper quadrant reveals evidence of gallstones with mildly thickened wall without evidence of tenderness or obstructing stone.  Laboratory evaluation does not reveal evidence of leukocytosis, though does identify a mild granulocytosis in this patient presenting with generalized weakness. Additional laboratory evaluation including troponin x2 and lipase and urinalysis does reveal evidence of a mildly diminished bicarbonate without evidence of solid organ injury or occult pyuria.  The patient is tolerating a regular diet in the ED, and is largely without complaints. She will be discharged home in stable condition with instructions to follow up  with her primary care physician as well as General surgery for further management of fatigue and presence of gallstones.             Clinical Impression:   The primary encounter diagnosis was Gallstones. A diagnosis of Non-intractable vomiting with nausea, unspecified vomiting type was also pertinent to this visit.      Disposition:   Disposition: Discharged  Condition: Stable                        Gerardo Colon MD  12/14/18 8096

## 2018-12-07 NOTE — ED NOTES
Catheterized urine specimen obtained using aseptic technique.  Approximately 10 ml of clear, yellow, free-flowing urine noted.  Pt tolerated well; specimen sent to lab.

## 2018-12-07 NOTE — ED NOTES
Dr Colon at bedside to reassess pt and discuss results and plan of care.  Pt provided with water and crackers for PO trial.

## 2018-12-07 NOTE — ED TRIAGE NOTES
Pt to the ER with complaints of right upper/mid abdominal pain with 4-5 episodes of vomiting that started this evening.

## 2018-12-17 ENCOUNTER — TELEPHONE (OUTPATIENT)
Dept: ORTHOPEDICS | Facility: CLINIC | Age: 73
End: 2018-12-17

## 2018-12-17 ENCOUNTER — OFFICE VISIT (OUTPATIENT)
Dept: ORTHOPEDICS | Facility: CLINIC | Age: 73
End: 2018-12-17
Payer: MEDICARE

## 2018-12-17 DIAGNOSIS — M17.0 PRIMARY OSTEOARTHRITIS OF BOTH KNEES: Primary | ICD-10-CM

## 2018-12-17 PROCEDURE — 20610 DRAIN/INJ JOINT/BURSA W/O US: CPT | Mod: 50,PBBFAC | Performed by: PHYSICIAN ASSISTANT

## 2018-12-17 PROCEDURE — 99999 PR PBB SHADOW E&M-EST. PATIENT-LVL III: CPT | Mod: PBBFAC,,, | Performed by: PHYSICIAN ASSISTANT

## 2018-12-17 PROCEDURE — 20610 DRAIN/INJ JOINT/BURSA W/O US: CPT | Mod: 50,S$PBB,, | Performed by: PHYSICIAN ASSISTANT

## 2018-12-17 PROCEDURE — 99203 OFFICE O/P NEW LOW 30 MIN: CPT | Mod: 25,S$PBB,, | Performed by: PHYSICIAN ASSISTANT

## 2018-12-17 PROCEDURE — 99213 OFFICE O/P EST LOW 20 MIN: CPT | Mod: PBBFAC | Performed by: PHYSICIAN ASSISTANT

## 2018-12-17 RX ORDER — METHYLPREDNISOLONE ACETATE 80 MG/ML
80 INJECTION, SUSPENSION INTRA-ARTICULAR; INTRALESIONAL; INTRAMUSCULAR; SOFT TISSUE
Status: COMPLETED | OUTPATIENT
Start: 2018-12-17 | End: 2018-12-17

## 2018-12-17 RX ADMIN — METHYLPREDNISOLONE ACETATE 80 MG: 80 INJECTION, SUSPENSION INTRALESIONAL; INTRAMUSCULAR; INTRASYNOVIAL; SOFT TISSUE at 04:12

## 2018-12-17 NOTE — TELEPHONE ENCOUNTER
Spoke with sister  Explained I had to cx with Mr Rhodes   She took Mrs Young PAC today @ 3:30 pm  She will inform her sister Tran

## 2018-12-17 NOTE — PROGRESS NOTES
Subjective:      Patient ID: Tran Bradley is a 72 y.o. female.    Chief Complaint: No chief complaint on file.    HPI  72 year old female presents with chief complaint of bilateral knee pain L>R x years. Pain is worse with walking and standing. Voltaren gel gives mild relief. She reports popping. She ambulates with a walker.   Review of Systems   Constitution: Negative for chills, fever and night sweats.   Cardiovascular: Negative for chest pain.   Respiratory: Negative for cough and shortness of breath.    Hematologic/Lymphatic: Does not bruise/bleed easily.   Skin: Negative for color change.   Gastrointestinal: Negative for heartburn.   Genitourinary: Negative for dysuria.   Neurological: Negative for numbness and paresthesias.   Psychiatric/Behavioral: Negative for altered mental status.   Allergic/Immunologic: Negative for persistent infections.         Objective:            General    Vitals reviewed.  Constitutional: She is oriented to person, place, and time. She appears well-developed and well-nourished.   Cardiovascular: Normal rate.    Neurological: She is alert and oriented to person, place, and time.             Left Knee Exam:  ROM 5-90 degrees  +crepitus  No effusion  TTP medial and lateral joint line    Right Knee Exam:  ROM 0-110 degrees  +crepitus  No effusion  TTP medial and lateral joint line      X-ray: reviewed by myself. Bilateral tricompartmental osteoarthritis which is most severe in the left lateral compartment.       Assessment:       Encounter Diagnosis   Name Primary?    Primary osteoarthritis of both knees Yes          Plan:       Discussed treatment options with patient. She would like bilateral cortisone injections. RTC prn.     PROCEDURE:  I have explained the risks, benefits, and alternatives of the procedure in detail.  The patient voices understanding and all questions have been answered.  The patient agrees to proceed as planned. So after I performed a sterile prep of the skin  in the normal fashion the bilateral knee is injected using a 22 gauge needle from the anterolateral approach with a combination of 4cc 1% plain lidocaine and 80 mg of depo medrol.  The patient is cautioned and immediate relief of pain is secondary to the local anesthetic and will be temporary.  After the anesthetic wears off there may be a increase in pain that may last for a few hours or a few days and they should use ice to help alleviate this flair up of pain.

## 2018-12-17 NOTE — LETTER
December 17, 2018      Jocelin Vargas MD  [address]  [facility] [address]           [facility] - Orthopedics  [address]  [facility] [address]  Phone: [phone]          Patient: Tran Bradley   MR Number: [id number]   YOB: 1945   Date of Visit: 12/17/2018       Dear Dr. Jocelin Vargas:    Thank you for referring Tran Bradley to me for evaluation. Attached you will find relevant portions of my assessment and plan of care.    If you have questions, please do not hesitate to call me. I look forward to following Tran Bradley along with you.    Sincerely,    Nadine Young PA-C    Enclosure  CC:  No Recipients    If you would like to receive this communication electronically, please contact [email or web address] or [phone] to request more information on [employer] Link access.    For providers and/or their staff who would like to refer a patient to [facility], please contact us through our one-stop-shop provider referral line, [facility] , at [phone].    If you feel you have received this communication in error or would no longer like to receive these types of communications, please e-mail [email or web address]

## 2019-01-31 ENCOUNTER — HOSPITAL ENCOUNTER (OUTPATIENT)
Dept: RADIOLOGY | Facility: CLINIC | Age: 74
Discharge: HOME | End: 2019-01-31
Attending: INTERNAL MEDICINE
Payer: MEDICARE

## 2019-01-31 DIAGNOSIS — Z78.0 ASYMPTOMATIC MENOPAUSAL STATE: ICD-10-CM

## 2019-01-31 DIAGNOSIS — Z13.820 OSTEOPOROSIS SCREENING: ICD-10-CM

## 2019-01-31 PROCEDURE — 77080 DEXA BONE DENSITY SPINE HIP: ICD-10-PCS | Mod: 26,,, | Performed by: INTERNAL MEDICINE

## 2019-01-31 PROCEDURE — 77080 DXA BONE DENSITY AXIAL: CPT | Mod: 26,,, | Performed by: INTERNAL MEDICINE

## 2019-01-31 PROCEDURE — 77080 DXA BONE DENSITY AXIAL: CPT | Mod: TC,PO

## 2019-02-12 ENCOUNTER — LAB VISIT (OUTPATIENT)
Dept: LAB | Facility: HOSPITAL | Age: 74
End: 2019-02-12
Attending: INTERNAL MEDICINE
Payer: MEDICARE

## 2019-02-12 DIAGNOSIS — I10 HYPERTENSION, UNSPECIFIED TYPE: ICD-10-CM

## 2019-02-12 LAB
ALBUMIN SERPL BCP-MCNC: 3.5 G/DL
ALP SERPL-CCNC: 78 U/L
ALT SERPL W/O P-5'-P-CCNC: 12 U/L
ANION GAP SERPL CALC-SCNC: 8 MMOL/L
AST SERPL-CCNC: 16 U/L
BILIRUB DIRECT SERPL-MCNC: 0.1 MG/DL
BILIRUB SERPL-MCNC: 0.3 MG/DL
BUN SERPL-MCNC: 15 MG/DL
CALCIUM SERPL-MCNC: 9.6 MG/DL
CHLORIDE SERPL-SCNC: 107 MMOL/L
CHOLEST SERPL-MCNC: 267 MG/DL
CHOLEST/HDLC SERPL: 5.1 {RATIO}
CO2 SERPL-SCNC: 27 MMOL/L
CREAT SERPL-MCNC: 0.9 MG/DL
EST. GFR  (AFRICAN AMERICAN): >60 ML/MIN/1.73 M^2
EST. GFR  (NON AFRICAN AMERICAN): >60 ML/MIN/1.73 M^2
GLUCOSE SERPL-MCNC: 104 MG/DL
HDLC SERPL-MCNC: 52 MG/DL
HDLC SERPL: 19.5 %
LDLC SERPL CALC-MCNC: 191.6 MG/DL
NONHDLC SERPL-MCNC: 215 MG/DL
POTASSIUM SERPL-SCNC: 3.9 MMOL/L
PROT SERPL-MCNC: 7.4 G/DL
SODIUM SERPL-SCNC: 142 MMOL/L
TRIGL SERPL-MCNC: 117 MG/DL

## 2019-02-12 PROCEDURE — 36415 COLL VENOUS BLD VENIPUNCTURE: CPT | Mod: PO

## 2019-02-12 PROCEDURE — 80048 BASIC METABOLIC PNL TOTAL CA: CPT

## 2019-02-12 PROCEDURE — 80061 LIPID PANEL: CPT

## 2019-02-12 PROCEDURE — 80076 HEPATIC FUNCTION PANEL: CPT

## 2019-02-13 RX ORDER — SERTRALINE HYDROCHLORIDE 50 MG/1
TABLET, FILM COATED ORAL
Qty: 90 TABLET | Refills: 1 | Status: SHIPPED | OUTPATIENT
Start: 2019-02-13 | End: 2019-02-19 | Stop reason: SDUPTHER

## 2019-02-19 ENCOUNTER — OFFICE VISIT (OUTPATIENT)
Dept: SURGERY | Facility: CLINIC | Age: 74
End: 2019-02-19
Payer: MEDICARE

## 2019-02-19 ENCOUNTER — OFFICE VISIT (OUTPATIENT)
Dept: INTERNAL MEDICINE | Facility: CLINIC | Age: 74
End: 2019-02-19
Payer: MEDICARE

## 2019-02-19 VITALS
SYSTOLIC BLOOD PRESSURE: 128 MMHG | WEIGHT: 140 LBS | DIASTOLIC BLOOD PRESSURE: 68 MMHG | TEMPERATURE: 98 F | BODY MASS INDEX: 21.97 KG/M2 | HEART RATE: 78 BPM | HEIGHT: 67 IN

## 2019-02-19 VITALS
HEIGHT: 67 IN | SYSTOLIC BLOOD PRESSURE: 122 MMHG | DIASTOLIC BLOOD PRESSURE: 62 MMHG | HEART RATE: 88 BPM | BODY MASS INDEX: 21.93 KG/M2

## 2019-02-19 DIAGNOSIS — K80.00 CALCULUS OF GALLBLADDER WITH ACUTE CHOLECYSTITIS WITHOUT OBSTRUCTION: Primary | ICD-10-CM

## 2019-02-19 DIAGNOSIS — M54.9 BACK PAIN, UNSPECIFIED BACK LOCATION, UNSPECIFIED BACK PAIN LATERALITY, UNSPECIFIED CHRONICITY: ICD-10-CM

## 2019-02-19 DIAGNOSIS — R32 URINARY INCONTINENCE, UNSPECIFIED TYPE: ICD-10-CM

## 2019-02-19 DIAGNOSIS — R41.3 MEMORY LOSS: ICD-10-CM

## 2019-02-19 DIAGNOSIS — R26.9 GAIT DISTURBANCE: ICD-10-CM

## 2019-02-19 DIAGNOSIS — K80.20 CALCULUS OF GALLBLADDER WITHOUT CHOLECYSTITIS WITHOUT OBSTRUCTION: ICD-10-CM

## 2019-02-19 DIAGNOSIS — E78.5 HYPERLIPIDEMIA, UNSPECIFIED HYPERLIPIDEMIA TYPE: ICD-10-CM

## 2019-02-19 DIAGNOSIS — I10 HYPERTENSION, UNSPECIFIED TYPE: Primary | ICD-10-CM

## 2019-02-19 DIAGNOSIS — K80.71 CALCULUS OF GALLBLADDER AND BILE DUCT WITH OBSTRUCTION WITHOUT CHOLECYSTITIS: ICD-10-CM

## 2019-02-19 DIAGNOSIS — M19.90 OSTEOARTHRITIS, UNSPECIFIED OSTEOARTHRITIS TYPE, UNSPECIFIED SITE: ICD-10-CM

## 2019-02-19 DIAGNOSIS — F41.9 ANXIETY: ICD-10-CM

## 2019-02-19 PROCEDURE — 99024 PR POST-OP FOLLOW-UP VISIT: ICD-10-PCS | Mod: POP,,, | Performed by: SURGERY

## 2019-02-19 PROCEDURE — 99214 OFFICE O/P EST MOD 30 MIN: CPT | Mod: S$PBB,,, | Performed by: INTERNAL MEDICINE

## 2019-02-19 PROCEDURE — 99024 POSTOP FOLLOW-UP VISIT: CPT | Mod: POP,,, | Performed by: SURGERY

## 2019-02-19 PROCEDURE — 99214 PR OFFICE/OUTPT VISIT, EST, LEVL IV, 30-39 MIN: ICD-10-PCS | Mod: S$PBB,,, | Performed by: INTERNAL MEDICINE

## 2019-02-19 PROCEDURE — 99999 PR PBB SHADOW E&M-EST. PATIENT-LVL IV: ICD-10-PCS | Mod: PBBFAC,,, | Performed by: INTERNAL MEDICINE

## 2019-02-19 PROCEDURE — 99999 PR PBB SHADOW E&M-EST. PATIENT-LVL III: CPT | Mod: PBBFAC,,, | Performed by: SURGERY

## 2019-02-19 PROCEDURE — 99999 PR PBB SHADOW E&M-EST. PATIENT-LVL IV: CPT | Mod: PBBFAC,,, | Performed by: INTERNAL MEDICINE

## 2019-02-19 PROCEDURE — 99214 OFFICE O/P EST MOD 30 MIN: CPT | Mod: PBBFAC,27 | Performed by: INTERNAL MEDICINE

## 2019-02-19 PROCEDURE — 99213 OFFICE O/P EST LOW 20 MIN: CPT | Mod: PBBFAC | Performed by: SURGERY

## 2019-02-19 PROCEDURE — 99999 PR PBB SHADOW E&M-EST. PATIENT-LVL III: ICD-10-PCS | Mod: PBBFAC,,, | Performed by: SURGERY

## 2019-02-19 RX ORDER — AMLODIPINE BESYLATE 10 MG/1
10 TABLET ORAL DAILY
Qty: 90 TABLET | Refills: 3 | Status: SHIPPED | OUTPATIENT
Start: 2019-02-19 | End: 2019-08-21 | Stop reason: SDUPTHER

## 2019-02-19 RX ORDER — SERTRALINE HYDROCHLORIDE 50 MG/1
50 TABLET, FILM COATED ORAL DAILY
Qty: 90 TABLET | Refills: 3 | Status: SHIPPED | OUTPATIENT
Start: 2019-02-19 | End: 2019-08-21 | Stop reason: SDUPTHER

## 2019-02-19 RX ORDER — ROSUVASTATIN CALCIUM 10 MG/1
10 TABLET, COATED ORAL DAILY
Qty: 90 TABLET | Refills: 3 | Status: SHIPPED | OUTPATIENT
Start: 2019-02-19 | End: 2019-08-21 | Stop reason: SDUPTHER

## 2019-02-19 NOTE — LETTER
February 19, 2019      Jocelin Vargas MD  [address]  [place] [address]           [facility] General Surgery  [address]  [facility] [address]  Phone: [phone]          Patient: Tran Bradley   MR Number: [id number]   YOB: 1945   Date of Visit: 2/19/2019       Dear Dr. Jocelin Vargas:    Thank you for referring Tran Bradley to me for evaluation. Attached you will find relevant portions of my assessment and plan of care.    If you have questions, please do not hesitate to call me. I look forward to following Tran Bradley along with you.    Sincerely,    Ema Rollins PA-C    Enclosure  CC:  No Recipients    If you would like to receive this communication electronically, please contact [email or web address] or [phone] to request more information on [employer] Link access.    For providers and/or their staff who would like to refer a patient to [facility], please contact us through our one-stop-shop provider referral line, [facility], at [phone].    If you feel you have received this communication in error or would no longer like to receive these types of communications, please e-mail [email or web address]

## 2019-02-19 NOTE — PROGRESS NOTES
History & Physical    SUBJECTIVE:     History of Present Illness:  Patient is a 73 y.o. female presents with abdominal pain. Onset of symptoms was abrupt starting a few weeks ago with stable course since that time. She had one episode of abdominal pain which prompted a visit to the ER. She was found to have cholelithiasis. Associated symptoms include nausea. Patient denies vomiting, diarrhea. Baseline mildly constipated. Food does not affect her abdominal pain. She denies any other episodes of abdominal pain prior to this attack or since. She does not smoke and does not take any blood thinners.     Chief Complaint   Patient presents with    Consult       Review of patient's allergies indicates:  No Known Allergies    Current Outpatient Medications   Medication Sig Dispense Refill    amLODIPine (NORVASC) 10 MG tablet Take 1 tablet (10 mg total) by mouth once daily. 90 tablet 3    diclofenac sodium (VOLTAREN) 1 % Gel Apply 2 g topically daily as needed. 100 g 6    rosuvastatin (CRESTOR) 10 MG tablet Take 1 tablet (10 mg total) by mouth once daily. 90 tablet 3    sertraline (ZOLOFT) 50 MG tablet Take 1 tablet (50 mg total) by mouth once daily. 90 tablet 3     No current facility-administered medications for this visit.        Past Medical History:   Diagnosis Date    Anxiety     Arthritis     Depression     Hypertension      Past Surgical History:   Procedure Laterality Date    BUNIONECTOMY      bilateral      Family History   Problem Relation Age of Onset    Hypertension Mother     Heart disease Mother     Hypertension Father     Asthma Father     Heart disease Father     Hypertension Sister     COPD Brother     Heart disease Brother      Social History     Tobacco Use    Smoking status: Never Smoker    Smokeless tobacco: Never Used   Substance Use Topics    Alcohol use: No    Drug use: No        Review of Systems:  Review of Systems   Constitutional: Negative for chills and fever.   HENT:  "Negative for congestion and rhinorrhea.    Eyes: Negative for photophobia and visual disturbance.   Respiratory: Negative for cough and shortness of breath.    Cardiovascular: Negative for chest pain and palpitations.   Gastrointestinal: Negative for nausea and vomiting.   Endocrine: Negative for cold intolerance and heat intolerance.   Musculoskeletal: Negative for arthralgias and myalgias.   Skin: Negative for rash and wound.   Allergic/Immunologic: Negative for immunocompromised state.   Neurological: Negative for tremors and weakness.   Hematological: Negative for adenopathy.   Psychiatric/Behavioral: Negative for agitation.       OBJECTIVE:     Vital Signs (Most Recent)  Temp: 98.1 °F (36.7 °C) (02/19/19 1305)  Pulse: 78 (02/19/19 1305)  BP: 128/68 (02/19/19 1305)  5' 7" (1.702 m)  63.5 kg (140 lb)     Physical Exam:  Physical Exam   Constitutional: She is oriented to person, place, and time. She appears well-developed and well-nourished. No distress.   HENT:   Head: Normocephalic and atraumatic.   Eyes: EOM are normal. No scleral icterus.   Neck: Normal range of motion. Neck supple.   Cardiovascular: Normal rate and regular rhythm.   Pulmonary/Chest: Effort normal. No respiratory distress.   Abdominal:   Soft, NTND, no previous abdominal surgical scars noted   Musculoskeletal: Normal range of motion. She exhibits no edema or tenderness.   Neurological: She is alert and oriented to person, place, and time.   Skin: Skin is warm and dry.   Psychiatric: She has a normal mood and affect.       Laboratory  LFTs WNL    Diagnostic Results:  Abd US  Gallbladder: A single 2.6 cm gallstone is seen within the gallbladder.  Assessment of stone mobility is suboptimal due to patient immobility.  Gallbladder wall is thickened measuring up to 5 mm.  No pericholecystic fluid.  Negative sonographic Pathak sign.    ASSESSMENT/PLAN:   74 yo female w cholelithiasis  -discussed risks/benefits of surgical intervention, patient elects " to hold off on surgery at this time  -she will call clinic to schedule surgery if symptoms develop or worsen

## 2019-02-20 NOTE — PROGRESS NOTES
"Subjective:       Patient ID: Tran Bradley is a 73 y.o. female.    Chief Complaint: Follow-up  73 year old pesents for follow up.  She is brought in by her sister who assist with her care and has been trying to hep with her medications  Pt has been a bit better she reports zoloft helping with anxiety and depression . Her arthritis continues to give her troube  She had injection which helped with her knees. She does not have maddy cholesterol medication with her they plan to pick it up and resume medication  She had episode of gi illness went er found gallstones at that time. She has not had symptoms since then    HPI  Review of Systems   Constitutional: Negative for fever.   Respiratory: Negative for cough, shortness of breath and wheezing.    Cardiovascular: Negative for chest pain and palpitations.   Gastrointestinal: Negative for abdominal pain and constipation.   Genitourinary:        Urinary incontinance    Musculoskeletal:        Arthritis knees   Back    Neurological: Negative for dizziness.   Psychiatric/Behavioral:        Memory loss   Depression doing beetter with medication        Objective:     Blood pressure 122/62, pulse 88, height 5' 7" (1.702 m).    Physical Exam   Constitutional: No distress.   HENT:   Head: Normocephalic.   Mouth/Throat: Oropharynx is clear and moist.   Eyes: No scleral icterus.   Neck: Neck supple.   Cardiovascular: Normal rate, regular rhythm and normal heart sounds. Exam reveals no gallop and no friction rub.   No murmur heard.  Pulmonary/Chest: Effort normal and breath sounds normal. No respiratory distress.   Abdominal: Soft. Bowel sounds are normal. She exhibits no mass. There is no tenderness.   Musculoskeletal: She exhibits no edema.   creptisu knees    Neurological: She is alert.   Skin: No erythema.   Vitals reviewed.      Assessment:       1. Hypertension, unspecified type    2. Urinary incontinence, unspecified type    3. Hyperlipidemia, unspecified hyperlipidemia type  "   4. Memory loss    5. Anxiety    6. Calculus of gallbladder and bile duct with obstruction without cholecystitis    7. Gait disturbance    8. Osteoarthritis, unspecified osteoarthritis type, unspecified site    9. Back pain, unspecified back location, unspecified back pain laterality, unspecified chronicity    10. Calculus of gallbladder without cholecystitis without obstruction        Plan:       Tran was seen today for follow-up.    Diagnoses and all orders for this visit:    Hypertension, unspecified type  Has been improve compliance with her bp medications  bp controlled  Sister has been assisting as well     Urinary incontinence, unspecified type  -     Ambulatory referral to Urology    Hyperlipidemia, unspecified hyperlipidemia type    Memory loss  Sister will plan to schedule neruology appt as planned    Anxiety / derpession   continu zoloft improved    Calculus of gallbladder and bile duct with obstruction without cholecystitis  Recent episode of symptoms discussed  Surgical consultation recommended   -     Ambulatory consult to General Surgery    Gait disturbance  Osteoarthritis, unspecified osteoarthritis type, unspecified sit  Back pain, unspecified back location, unspecified back pain laterality, unspecified chronicity  -     Ambulatory consult to Physical Therapy    hyperlipdemia she will resumer her cholesterol medication   -     rosuvastatin (CRESTOR) 10 MG tablet; Take 1 tablet (10 mg total) by mouth once daily.  -     sertraline (ZOLOFT) 50 MG tablet; Take 1 tablet (50 mg total) by mouth once daily.  -     amLODIPine (NORVASC) 10 MG tablet; Take 1 tablet (10 mg total) by mouth once daily.    Follow up 4 months

## 2019-03-18 ENCOUNTER — OFFICE VISIT (OUTPATIENT)
Dept: UROLOGY | Facility: CLINIC | Age: 74
End: 2019-03-18
Payer: MEDICARE

## 2019-03-18 VITALS
BODY MASS INDEX: 24.16 KG/M2 | HEART RATE: 82 BPM | WEIGHT: 145 LBS | DIASTOLIC BLOOD PRESSURE: 73 MMHG | HEIGHT: 65 IN | SYSTOLIC BLOOD PRESSURE: 124 MMHG

## 2019-03-18 DIAGNOSIS — N39.41 URGE INCONTINENCE: Primary | ICD-10-CM

## 2019-03-18 DIAGNOSIS — K59.04 CHRONIC IDIOPATHIC CONSTIPATION: ICD-10-CM

## 2019-03-18 PROCEDURE — 99999 PR PBB SHADOW E&M-EST. PATIENT-LVL IV: CPT | Mod: PBBFAC,,, | Performed by: UROLOGY

## 2019-03-18 PROCEDURE — 99999 PR PBB SHADOW E&M-EST. PATIENT-LVL IV: ICD-10-PCS | Mod: PBBFAC,,, | Performed by: UROLOGY

## 2019-03-18 PROCEDURE — 51701 INSERT BLADDER CATHETER: CPT | Mod: PBBFAC | Performed by: UROLOGY

## 2019-03-18 PROCEDURE — 99205 PR OFFICE/OUTPT VISIT, NEW, LEVL V, 60-74 MIN: ICD-10-PCS | Mod: S$PBB,,, | Performed by: UROLOGY

## 2019-03-18 PROCEDURE — 87086 URINE CULTURE/COLONY COUNT: CPT

## 2019-03-18 PROCEDURE — 99205 OFFICE O/P NEW HI 60 MIN: CPT | Mod: S$PBB,,, | Performed by: UROLOGY

## 2019-03-18 PROCEDURE — 99214 OFFICE O/P EST MOD 30 MIN: CPT | Mod: PBBFAC | Performed by: UROLOGY

## 2019-03-18 NOTE — PROGRESS NOTES
CHIEF COMPLAINT:    Mrs. Bradley is a 73 y.o. female presenting for a consultation at the request of Dr. Jocelin Vargas. Patient presents with urge incontinence.    PRESENTING ILLNESS:    Tran Bradley is a 73 y.o. female who is accompanied by her sister, here for evaluation of urge incontinence.  Most of the history is obtained through her sister as the patient was not very cooperative with her history.  She was cooperative with the exam.  She has had urge incontinence for years.  Her sister recalls that she had to use protection for urinary incontinence 4 years ago.  The patient lives with her son.  She has worn pull ups, unknown how many she goes through.  When asked specific questions, she states that she has hesitancy, feels like she empties well and denies any prolapse symptoms.  She denies recurrent UTI's or gross hematuria.      , uterus in place, not sexually active (no partner), tends to have constipation that she waits to let nature take its course.    REVIEW OF SYSTEMS:    Review of Systems   Constitutional: Negative.    HENT: Negative.    Eyes: Negative.    Respiratory: Negative.    Cardiovascular: Negative.    Gastrointestinal: Positive for constipation.   Genitourinary: Positive for frequency and urgency.   Musculoskeletal: Negative.    Skin: Negative.    Neurological: Negative.    Endo/Heme/Allergies: Negative.    Psychiatric/Behavioral: Positive for depression. The patient is nervous/anxious.        PATIENT HISTORY:    Past Medical History:   Diagnosis Date    Anxiety     Arthritis     Depression     Hypertension        Past Surgical History:   Procedure Laterality Date    BUNIONECTOMY      bilateral        Family History   Problem Relation Age of Onset    Hypertension Mother     Heart disease Mother     Hypertension Father     Asthma Father     Heart disease Father     Hypertension Sister     COPD Brother     Heart disease Brother      Socioeconomic History    Marital status:     Tobacco Use    Smoking status: Never Smoker    Smokeless tobacco: Never Used   Substance and Sexual Activity    Alcohol use: No    Drug use: No    Sexual activity: Not on file       Allergies:  Patient has no known allergies.    Medications:  Outpatient Encounter Medications as of 3/18/2019   Medication Sig Dispense Refill    amLODIPine (NORVASC) 10 MG tablet Take 1 tablet (10 mg total) by mouth once daily. 90 tablet 3    diclofenac sodium (VOLTAREN) 1 % Gel Apply 2 g topically daily as needed. 100 g 6    rosuvastatin (CRESTOR) 10 MG tablet Take 1 tablet (10 mg total) by mouth once daily. 90 tablet 3    sertraline (ZOLOFT) 50 MG tablet Take 1 tablet (50 mg total) by mouth once daily. 90 tablet 3    mirabegron (MYRBETRIQ) 50 mg Tb24 Take 1 tablet (50 mg total) by mouth once daily. 30 tablet 11     No facility-administered encounter medications on file as of 3/18/2019.          PHYSICAL EXAMINATION:    The patient generally appears in good health, is appropriately interactive, and is in no apparent distress.    Skin: No lesions.    Mental: Cooperative with normal affect.    Neuro: Grossly intact.    HEENT: Normal. No evidence of lymphadenopathy.    Chest:  normal inspiratory effort.    Abdomen:  Soft, non-tender. No masses or organomegaly. Bladder is not palpable. No evidence of flank discomfort. No evidence of inguinal hernia.    Extremities: No clubbing, cyanosis, or edema    Normal external female genitalia  Urethral meatus is normal  Urethra and bladder are nontender to bimanual exam  Well supported anteriorly and posteriorly   Uterus and cervix are normal  No adnexal masses  Amount in the bladder by catheterization was 80 ml    LABS:    Lab Results   Component Value Date    BUN 15 02/12/2019    CREATININE 0.9 02/12/2019       IMPRESSION:    Encounter Diagnoses   Name Primary?    Urge incontinence Yes    Chronic idiopathic constipation        PLAN:    1. Myrbetriq prescribed given her memory  loss and chronic constipation  2.  Follow up in 1 month to evaluate response to therapy.  3.  The catheterized specimen was sent for culture

## 2019-03-19 LAB — BACTERIA UR CULT: NO GROWTH

## 2019-03-20 ENCOUNTER — CLINICAL SUPPORT (OUTPATIENT)
Dept: REHABILITATION | Facility: HOSPITAL | Age: 74
End: 2019-03-20
Attending: INTERNAL MEDICINE
Payer: MEDICARE

## 2019-03-20 DIAGNOSIS — R26.81 UNSTEADY GAIT: ICD-10-CM

## 2019-03-20 DIAGNOSIS — M54.50 CHRONIC MIDLINE LOW BACK PAIN WITHOUT SCIATICA: ICD-10-CM

## 2019-03-20 DIAGNOSIS — G89.29 CHRONIC MIDLINE LOW BACK PAIN WITHOUT SCIATICA: ICD-10-CM

## 2019-03-20 DIAGNOSIS — G89.29 CHRONIC PAIN OF BOTH KNEES: ICD-10-CM

## 2019-03-20 DIAGNOSIS — M25.562 CHRONIC PAIN OF BOTH KNEES: ICD-10-CM

## 2019-03-20 DIAGNOSIS — M25.561 CHRONIC PAIN OF BOTH KNEES: ICD-10-CM

## 2019-03-20 PROCEDURE — 97110 THERAPEUTIC EXERCISES: CPT | Mod: PN

## 2019-03-20 PROCEDURE — 97161 PT EVAL LOW COMPLEX 20 MIN: CPT | Mod: PN

## 2019-03-20 NOTE — PROGRESS NOTES
"  [facility]  Physical Therapy Initial Evaluation    Name: Tran Bradley  Clinic Number: [id number]    Therapy Diagnosis:   Encounter Diagnoses   Name Primary?    Chronic pain of both knees     Unsteady gait     Chronic midline low back pain without sciatica      Physician: Jocelin Vargas*    Physician Orders: PT Eval and Treat   Medical Diagnosis from Referral: Gait disturbances; Osteoarthritis, unspecified osteoarthritis type, unspecified site; Back pain, unspecified back location, unspecified back pain laterality, unspecified chronicity;  Evaluation Date: 3/20/2019  Authorization Period Expiration: 2/19/2020  Plan of Care Expiration: 5/20/2019  Visit # / Visits authorized: 1/ 1    Time In: 1250  Time Out: 1345  Total Billable Time: 55 minutes    Precautions: Standard and Fall    Subjective   Date of onset: "Years ago"   History of current condition - Tran and Tran's sister Martita reports: she has been having back and knee pain, reports knee pain is primary. Denies having any surgeries to her LEs. Pt reports bending forward makes her back pain worse. Patient uses hot pack on her back. Pt was using a SPC for walking for "years" but reports that she started using a FWW about a year ago as she was unsteady. Pt sister reports pt has had multiple falls, and had a fall yesterday when getting into her chair. States that pt falls about twice per month. Pt sister also reports that pt has been lying in bed "too much" and does not walk unless she needs to.     Medical History:   Past Medical History:   Diagnosis Date    Anxiety     Arthritis     Depression     Hypertension        Surgical History:   Tran Bradley  has a past surgical history that includes Bunionectomy.    Medications:   Tran has a current medication list which includes the following prescription(s): amlodipine, diclofenac sodium, mirabegron, rosuvastatin, and sertraline.    Allergies:   Review of patient's " allergies indicates:  No Known Allergies     Imaging bone scan films:   Impression: Normal BMD of the lumbar spine and hip.    Prior Therapy: None  Social History: Patient lives with her sister  Occupation: None  Prior Level of Function: Pt was ambulating independently with SPC in home and with SBA in community. Had minimal knee pain with WB activities.  Current Level of Function: Pt currently requiring SBA during gait x100' with FWW, has moderate>severe knee pain with ambulation.    Pain:  Current 0/10, worst 9/10, best 0/10   Location: bilateral knee   Description: Aching and Sharp  Aggravating Factors: Standing and Walking  Easing Factors: pain medication and heating pad    Pts goals: Pt wants to walk better without pain    Objective     Observation: alert and oriented x4    Posture Alignment: slouched posture;forward head;trunk deviated left    Sensation: Pt had difficulty following commands for sensation testing, unable to keep eyes closed and unable to communicate properly when she felt sensation.    DTR: WNL    GAIT DEVIATIONS: Tran displays unsteady gait;decreased step length;antalgic gait;anterior lean;    Knee Range of Motion:    Left degrees Right Degrees   Flexion 110+ 110+     Extension (7) from neutral (5) from neutral      *= pain      Lower Extremity Strength    Right LE  Left LE    Hip flexion: 4/5 Hip flexion: 4/5   Knee extension: 3+/5 Knee extension: 3+/5   Knee flexion: 3+/5 Knee flexion: 3+/5   Hip IR: 4/5 Hip IR: 4/5   Hip ER: 4/5 Hip ER: 4/5   Hip extension:  4/5 Hip extension: 4/5   Hip abduction: 3+/5 Hip abduction: 3+/5   Hip adduction: 5/5 Hip adduction 5/5   Ankle dorsiflexion: 4+/5 Ankle dorsiflexion: 4+/5   Ankle plantarflexion: 3+/5 Ankle plantarflexion: 3+/5     Special Tests:  -ROBYN: negative  -Repeated Flexion: negative  -Repeated Ext:negative    SI provocation test:  Distraction test: negative  Compression test: negative    Neuro Dynamic Testing:    Sciatic nerve:      SLR:  negative   Femoral Nerve:    Femoral nerve test: negative   Neural Tension:     Slump test: negative    Joint Mobility: Pt unable to obtain prone positioning    Palpation: TTP B knees med/lat joint line otherwise WNL    Flexibility:    Popliteal Angle: R = 5 degrees ; L = 0 degrees   Michel's test: R = - ; L = -   Chao test: R = + ; L = +    Balance:  Rhomberg EO: 15secs  Rhomberg EC: 0secs    TINETTI BALANCE ASSESSMENT TOOL    Davidsonetti ME, Tony TF, Rose Mary R, Fall Risk Index for elderly patients based on number of chronic disabilities.Am J Med [id number]      BALANCE SECTION  Patient is seated in hard, armless chair;    1.Sitting Balance:   Steady; safe = 1  2.Rises from chair :  Able, uses arms to help = 1  3.Attempts to arise :  Able, requirese > 1 attempt = 1  4.Immediate standing Balance (first 5 seconds) : Steady but uses walker or other support = 1  5.Standing balance: Unsteady = 0  6.Nudged : Begins to fall = 0  7.Eyes closed: Unsteady = 0  8.Turning 360 degrees:  Discontinuous steps = 0 and Unsteady (grabs, staggers) = 0  9.Sitting Down : Unsafe (misjudged distance, falls into chair) = 0    Balance Score:  4/16    GAIT SECTION  Patient stands with therapist, walks across room (+/- aids), first at usual pace, then at rapid pace.    10.Initiation of Gait (Immediately after told to go.): No hesitancy = 1  11.Step length and height :  Step to=0  12.Foot Clearance :  Foot drop=0   13.Step symmetry: Right & Left step length appear equal = 1  14.Step continuity : Stooping or discontinuity between steps = 0  15.Path: Mild/moderate deviation or uses walking aid = 1  16.Trunk : Marked sway or uses walking aid = 0  17.Walking Time: Heels apart = 0    Gait Score: 3/12  Balance score:7/16  Total Score=Balance + Gait Score: 10/28     Risk Indicators:    Tinetti Tool Score   Risk of Falls   ?18  "   High   19-23   Moderate   ?24   Low    -------------------------------------------------------------------------------------------------------------------------------------    PT Evaluation Completed? Yes  Discussed Plan of Care with patient: Yes        TREATMENT   Treatment Time In: 1330  Treatment Time Out: 1345  Total Treatment time separate from Evaluation: 15 minutes    Tran received therapeutic exercises to develop strength, ROM and flexibility for 15 minutes including:  HS stretch with strap 2x20" BLE  BLE SLR x10   BLE LAQ x10  Patient required maximum cues to perform correctly and continued to have difficulty, was unable to perform correctly after max cues and education, will cont to require education and guidance with exercises.    Home Exercises and Patient Education Provided    Education provided:   - Pt educated on pathology of OA and purpose of intended treatments in PT  - Pt educated on proper gait form and posture  - Educated on importance of continued mobility    Written Home Exercises Provided: yes.  Exercises were reviewed and Tran was able to demonstrate them prior to the end of the session.  Tran demonstrated poor understanding of the education provided.     See EMR under Patient Instructions for exercises provided 3/20/2019.    Assessment   Tran is a 73 y.o. female referred to outpatient Physical Therapy with a medical diagnosis of unspecified arthritis with gait disturbances. Pt presents with c/o B knee and low back pain, B knee pain is primary complaint. Pt had significant difficulty following commands and appropriately answering questions throughout evaluation despite being oriented and having no mental impairments. Evaluation was performed with pt sister present, pt and sister argued consistently t/o evaluation and pt seems to cooperate less when sister is arguing with her. Pt demonstrating B knee pain during evaluation, PT was unable to reproduce pt back pain during " evaluation. Pt reports LBP is worsened when she bends over however repeated flexion in seated position did not reproduce pain. Pt knee pain increases with WB activities such as standing or walking. Pt ambulates with slow unsteady gait with FWW, requires SBA during gait with FWW, CGA during standing balance without AD. Pt was given HEP but had significant diffiulty performing correctly even with maximum cues, exercise intensity was held to a minimum for HEP in order to promote pt compliance as pt sister does not believe she will be able to perform exercises at home. Overall pt demonstrates pain, decreased LE strength, decreased flexibility, impaired gait/mobility, decreased endurance, and impaired balance.     Pt prognosis is Good.   Pt will benefit from skilled outpatient Physical Therapy to address the deficits stated above and in the chart below, provide pt/family education, and to maximize pt's level of independence.     Plan of care discussed with patient: Yes  Pt's spiritual, cultural and educational needs considered and patient is agreeable to the plan of care and goals as stated below:     Anticipated Barriers for therapy: None    Medical Necessity is demonstrated by the following  History  Co-morbidities and personal factors that may impact the plan of care Co-morbidities:   anxiety, depression and HTN    Personal Factors:   no deficits     low   Examination  Body Structures and Functions, activity limitations and participation restrictions that may impact the plan of care Body Regions:   back  lower extremities    Body Systems:    gross symmetry  ROM  strength  gross coordinated movement  balance  gait  transfers  motor control    Participation Restrictions:   Walking and going out of home    Activity limitations:   Learning and applying knowledge  no deficits    General Tasks and Commands  no deficits    Communication  no deficits    Mobility  lifting and carrying objects  walking  using transportation  (bus, train, plane, car)  driving (bike, car, motorcycle)    Self care  washing oneself (bathing, drying, washing hands)  caring for body parts (brushing teeth, shaving, grooming)  dressing    Domestic Life  shopping  doing house work (cleaning house, washing dishes, laundry)    Interactions/Relationships  no deficits    Life Areas  no deficits    Community and Social Life  no deficits         Complexity: low  See FOTO outcome assessment   Clinical Presentation stable and uncomplicated low   Decision Making/ Complexity Score: low     GOALS: Short Term Goals:  4 weeks  1. Report decreased in pain at worse less than  <   / =  4  /10  to increase tolerance for functional activities  2. Pt to increase B popliteal angle by greater than > or = 5 degrees in order to improve flexibility and posture.   3. Increased B knee ext MMT 1/2  to increase tolerance for ADL and work activities.  4. Pt to tolerate HEP to improve ROM and independence with ADL's  5. Pt to improve range of motion by 25% to allow for improved functional mobility to allow for improvement in IADLs.   6. Pt will demonstrate improved score on tinetti to 12/28 in order to decrease risk for falls.    Long Term Goals: 8 weeks  1. Report decreased in pain at worse less than  <   / =  2  /10  to increase tolerance for functional mobility.   2 .Pt to increase B popliteal angle by greater than > or = 10 degrees in order to improve flexibility and posture.   3. Increased B knee ext MMT 1 grade to increase tolerance for ADL and work activities.  4. Pt will report at 60-80% or less limitation on FOTO  to demonstrate decrease in disability and improvement in back pain.  5. Pt to be Independent with HEP to improve ROM and independence with ADL's  6. Pt will demonstrate improved score on tinetti to 12/28 in order to decrease risk for falls.  7. Pt to report 0 falls in the past month in order to improve safety with fx mobility in the home.      Plan   Plan of care  Certification: 3/20/2019 to 5/20/2019.    Outpatient Physical Therapy 2 times weekly for 8 weeks to include the following interventions: Gait Training, Moist Heat/ Ice, Neuromuscular Re-ed, Patient Education, Self Care, Therapeutic Activites and Therapeutic Exercise.     Zach Christiansen, PT

## 2019-03-20 NOTE — PLAN OF CARE
"  [facility]  Physical Therapy Initial Evaluation    Name: Tran Bradley  Clinic Number: [id number]    Therapy Diagnosis:   Encounter Diagnoses   Name Primary?    Chronic pain of both knees     Unsteady gait     Chronic midline low back pain without sciatica      Physician: Jocelin Vargas*    Physician Orders: PT Eval and Treat   Medical Diagnosis from Referral: Gait disturbances; Osteoarthritis, unspecified osteoarthritis type, unspecified site; Back pain, unspecified back location, unspecified back pain laterality, unspecified chronicity;  Evaluation Date: 3/20/2019  Authorization Period Expiration: 2/19/2020  Plan of Care Expiration: 5/20/2019  Visit # / Visits authorized: 1/ 1    Time In: 1250  Time Out: 1345  Total Billable Time: 55 minutes    Precautions: Standard and Fall    Subjective   Date of onset: "Years ago"   History of current condition - Tran and Tran's sister Martita reports: she has been having back and knee pain, reports knee pain is primary. Denies having any surgeries to her LEs. Pt reports bending forward makes her back pain worse. Patient uses hot pack on her back. Pt was using a SPC for walking for "years" but reports that she started using a FWW about a year ago as she was unsteady. Pt sister reports pt has had multiple falls, and had a fall yesterday when getting into her chair. States that pt falls about twice per month. Pt sister also reports that pt has been lying in bed "too much" and does not walk unless she needs to.     Medical History:   Past Medical History:   Diagnosis Date    Anxiety     Arthritis     Depression     Hypertension        Surgical History:   Tran Bradley  has a past surgical history that includes Bunionectomy.    Medications:   Tran has a current medication list which includes the following prescription(s): amlodipine, diclofenac sodium, mirabegron, rosuvastatin, and sertraline.    Allergies:   Review of patient's " allergies indicates:  No Known Allergies     Imaging bone scan films:   Impression: Normal BMD of the lumbar spine and hip.    Prior Therapy: None  Social History: Patient lives with her sister  Occupation: None  Prior Level of Function: Pt was ambulating independently with SPC in home and with SBA in community. Had minimal knee pain with WB activities.  Current Level of Function: Pt currently requiring SBA during gait x100' with FWW, has moderate>severe knee pain with ambulation.    Pain:  Current 0/10, worst 9/10, best 0/10   Location: bilateral knee   Description: Aching and Sharp  Aggravating Factors: Standing and Walking  Easing Factors: pain medication and heating pad    Pts goals: Pt wants to walk better without pain    Objective     Observation: alert and oriented x4    Posture Alignment: slouched posture;forward head;trunk deviated left    Sensation: Pt had difficulty following commands for sensation testing, unable to keep eyes closed and unable to communicate properly when she felt sensation.    DTR: WNL    GAIT DEVIATIONS: Tran displays unsteady gait;decreased step length;antalgic gait;anterior lean;    Knee Range of Motion:    Left degrees Right Degrees   Flexion 110+ 110+     Extension (7) from neutral (5) from neutral      *= pain      Lower Extremity Strength    Right LE  Left LE    Hip flexion: 4/5 Hip flexion: 4/5   Knee extension: 3+/5 Knee extension: 3+/5   Knee flexion: 3+/5 Knee flexion: 3+/5   Hip IR: 4/5 Hip IR: 4/5   Hip ER: 4/5 Hip ER: 4/5   Hip extension:  4/5 Hip extension: 4/5   Hip abduction: 3+/5 Hip abduction: 3+/5   Hip adduction: 5/5 Hip adduction 5/5   Ankle dorsiflexion: 4+/5 Ankle dorsiflexion: 4+/5   Ankle plantarflexion: 3+/5 Ankle plantarflexion: 3+/5     Special Tests:  -ROBYN: negative  -Repeated Flexion: negative  -Repeated Ext:negative    SI provocation test:  Distraction test: negative  Compression test: negative    Neuro Dynamic Testing:    Sciatic nerve:      SLR:  negative   Femoral Nerve:    Femoral nerve test: negative   Neural Tension:     Slump test: negative    Joint Mobility: Pt unable to obtain prone positioning    Palpation: TTP B knees med/lat joint line otherwise WNL    Flexibility:    Popliteal Angle: R = 5 degrees ; L = 0 degrees   Michel's test: R = - ; L = -   Chao test: R = + ; L = +    Balance:  Rhomberg EO: 15secs  Rhomberg EC: 0secs    TINETTI BALANCE ASSESSMENT TOOL    Davidsonetti ME, Tony TF, Rose Mary R, Fall Risk Index for elderly patients based on number of chronic disabilities.Am J Med [id number]      BALANCE SECTION  Patient is seated in hard, armless chair;    1.Sitting Balance:   Steady; safe = 1  2.Rises from chair :  Able, uses arms to help = 1  3.Attempts to arise :  Able, requirese > 1 attempt = 1  4.Immediate standing Balance (first 5 seconds) : Steady but uses walker or other support = 1  5.Standing balance: Unsteady = 0  6.Nudged : Begins to fall = 0  7.Eyes closed: Unsteady = 0  8.Turning 360 degrees:  Discontinuous steps = 0 and Unsteady (grabs, staggers) = 0  9.Sitting Down : Unsafe (misjudged distance, falls into chair) = 0    Balance Score:  4/16    GAIT SECTION  Patient stands with therapist, walks across room (+/- aids), first at usual pace, then at rapid pace.    10.Initiation of Gait (Immediately after told to go.): No hesitancy = 1  11.Step length and height :  Step to=0  12.Foot Clearance :  Foot drop=0   13.Step symmetry: Right & Left step length appear equal = 1  14.Step continuity : Stooping or discontinuity between steps = 0  15.Path: Mild/moderate deviation or uses walking aid = 1  16.Trunk : Marked sway or uses walking aid = 0  17.Walking Time: Heels apart = 0    Gait Score: 3/12  Balance score:7/16  Total Score=Balance + Gait Score: 10/28     Risk Indicators:    Tinetti Tool Score   Risk of Falls   ?18  "   High   19-23   Moderate   ?24   Low    -------------------------------------------------------------------------------------------------------------------------------------    PT Evaluation Completed? Yes  Discussed Plan of Care with patient: Yes        TREATMENT   Treatment Time In: 1330  Treatment Time Out: 1345  Total Treatment time separate from Evaluation: 15 minutes    Tran received therapeutic exercises to develop strength, ROM and flexibility for 15 minutes including:  HS stretch with strap 2x20" BLE  BLE SLR x10   BLE LAQ x10  Patient required maximum cues to perform correctly and continued to have difficulty, was unable to perform correctly after max cues and education, will cont to require education and guidance with exercises.    Home Exercises and Patient Education Provided    Education provided:   - Pt educated on pathology of OA and purpose of intended treatments in PT  - Pt educated on proper gait form and posture  - Educated on importance of continued mobility    Written Home Exercises Provided: yes.  Exercises were reviewed and Tran was able to demonstrate them prior to the end of the session.  Tran demonstrated poor understanding of the education provided.     See EMR under Patient Instructions for exercises provided 3/20/2019.    Assessment   Tran is a 73 y.o. female referred to outpatient Physical Therapy with a medical diagnosis of unspecified arthritis with gait disturbances. Pt presents with c/o B knee and low back pain, B knee pain is primary complaint. Pt had significant difficulty following commands and appropriately answering questions throughout evaluation despite being oriented and having no mental impairments. Evaluation was performed with pt sister present, pt and sister argued consistently t/o evaluation and pt seems to cooperate less when sister is arguing with her. Pt demonstrating B knee pain during evaluation, PT was unable to reproduce pt back pain during " evaluation. Pt reports LBP is worsened when she bends over however repeated flexion in seated position did not reproduce pain. Pt knee pain increases with WB activities such as standing or walking. Pt ambulates with slow unsteady gait with FWW, requires SBA during gait with FWW, CGA during standing balance without AD. Pt was given HEP but had significant diffiulty performing correctly even with maximum cues, exercise intensity was held to a minimum for HEP in order to promote pt compliance as pt sister does not believe she will be able to perform exercises at home. Overall pt demonstrates pain, decreased LE strength, decreased flexibility, impaired gait/mobility, decreased endurance, and impaired balance.     Pt prognosis is Good.   Pt will benefit from skilled outpatient Physical Therapy to address the deficits stated above and in the chart below, provide pt/family education, and to maximize pt's level of independence.     Plan of care discussed with patient: Yes  Pt's spiritual, cultural and educational needs considered and patient is agreeable to the plan of care and goals as stated below:     Anticipated Barriers for therapy: None    Medical Necessity is demonstrated by the following  History  Co-morbidities and personal factors that may impact the plan of care Co-morbidities:   anxiety, depression and HTN    Personal Factors:   no deficits     low   Examination  Body Structures and Functions, activity limitations and participation restrictions that may impact the plan of care Body Regions:   back  lower extremities    Body Systems:    gross symmetry  ROM  strength  gross coordinated movement  balance  gait  transfers  motor control    Participation Restrictions:   Walking and going out of home    Activity limitations:   Learning and applying knowledge  no deficits    General Tasks and Commands  no deficits    Communication  no deficits    Mobility  lifting and carrying objects  walking  using transportation  (bus, train, plane, car)  driving (bike, car, motorcycle)    Self care  washing oneself (bathing, drying, washing hands)  caring for body parts (brushing teeth, shaving, grooming)  dressing    Domestic Life  shopping  doing house work (cleaning house, washing dishes, laundry)    Interactions/Relationships  no deficits    Life Areas  no deficits    Community and Social Life  no deficits         Complexity: low  See FOTO outcome assessment   Clinical Presentation stable and uncomplicated low   Decision Making/ Complexity Score: low     GOALS: Short Term Goals:  4 weeks  1. Report decreased in pain at worse less than  <   / =  4  /10  to increase tolerance for functional activities  2. Pt to increase B popliteal angle by greater than > or = 5 degrees in order to improve flexibility and posture.   3. Increased B knee ext MMT 1/2  to increase tolerance for ADL and work activities.  4. Pt to tolerate HEP to improve ROM and independence with ADL's  5. Pt to improve range of motion by 25% to allow for improved functional mobility to allow for improvement in IADLs.   6. Pt will demonstrate improved score on tinetti to 12/28 in order to decrease risk for falls.    Long Term Goals: 8 weeks  1. Report decreased in pain at worse less than  <   / =  2  /10  to increase tolerance for functional mobility.   2 .Pt to increase B popliteal angle by greater than > or = 10 degrees in order to improve flexibility and posture.   3. Increased B knee ext MMT 1 grade to increase tolerance for ADL and work activities.  4. Pt will report at 60-80% or less limitation on FOTO  to demonstrate decrease in disability and improvement in back pain.  5. Pt to be Independent with HEP to improve ROM and independence with ADL's  6. Pt will demonstrate improved score on tinetti to 12/28 in order to decrease risk for falls.  7. Pt to report 0 falls in the past month in order to improve safety with fx mobility in the home.      Plan   Plan of care  Certification: 3/20/2019 to 5/20/2019.    Outpatient Physical Therapy 2 times weekly for 8 weeks to include the following interventions: Gait Training, Moist Heat/ Ice, Neuromuscular Re-ed, Patient Education, Self Care, Therapeutic Activites and Therapeutic Exercise.     Zach Christiansen, PT

## 2019-03-27 ENCOUNTER — CLINICAL SUPPORT (OUTPATIENT)
Dept: REHABILITATION | Facility: HOSPITAL | Age: 74
End: 2019-03-27
Attending: INTERNAL MEDICINE
Payer: MEDICARE

## 2019-03-27 DIAGNOSIS — M54.50 CHRONIC MIDLINE LOW BACK PAIN WITHOUT SCIATICA: ICD-10-CM

## 2019-03-27 DIAGNOSIS — M25.561 CHRONIC PAIN OF BOTH KNEES: ICD-10-CM

## 2019-03-27 DIAGNOSIS — M25.562 CHRONIC PAIN OF BOTH KNEES: ICD-10-CM

## 2019-03-27 DIAGNOSIS — G89.29 CHRONIC PAIN OF BOTH KNEES: ICD-10-CM

## 2019-03-27 DIAGNOSIS — R26.81 UNSTEADY GAIT: ICD-10-CM

## 2019-03-27 DIAGNOSIS — G89.29 CHRONIC MIDLINE LOW BACK PAIN WITHOUT SCIATICA: ICD-10-CM

## 2019-03-27 PROCEDURE — 97110 THERAPEUTIC EXERCISES: CPT | Mod: PN

## 2019-03-27 NOTE — PROGRESS NOTES
"  Physical Therapy Daily Treatment Note     Name: Tran Bradley  Clinic Number: [id number]    Therapy Diagnosis:   Encounter Diagnoses   Name Primary?    Chronic pain of both knees     Unsteady gait     Chronic midline low back pain without sciatica      Physician: Jocelin Vargas*    Visit Date: 3/27/2019    Physician Orders: PT Eval and Treat   Medical Diagnosis from Referral: Gait disturbances; Osteoarthritis, unspecified osteoarthritis type, unspecified site; Back pain, unspecified back location, unspecified back pain laterality, unspecified chronicity;  Evaluation Date: 3/20/2019  Authorization Period Expiration: 2/19/2020  Plan of Care Expiration: 5/20/2019  Visit # / Visits authorized: 1/ 1     Time In: 1220  Time Out: 1300  Total Billable Time: 45 minutes     Precautions: Standard and Fall    Subjective     Pt reports: she is not having any pain today but is feeling tired. Pt sister reports pt has been reluctantly performing the exercises, states pt "doesn't want to do anything".  She was compliant with home exercise program.  Response to previous treatment: Nothing to report  Functional change: None at this time    Pain: 0/10  Location: Central back      Objective     Tran received therapeutic exercises to develop strength, ROM and flexibility for 45 minutes including:  NU step x6' level 1  2x10 sit<>stand from mat    HS stretch with strap 2x20" BLE  BLE SLR 2x10   BLE LAQ 2x10  Seated marching x20  Ankle pumps x20  Hip add with ball in hooklying x30  Hip abd with RTB in hooklying x20    Tran received the following manual therapy techniques: Joint mobilizations, Manual traction and Soft tissue Mobilization were applied to the: L knee for 00 minutes, including:      Tran participated in neuromuscular re-education activities to improve: Balance, Coordination, Proprioception and Posture for 00 minutes. The following activities were included:      Tran participated in gait training to " improve functional mobility and safety for 00  minutes, including:      Tran received hot pack for knee minutes to 00. Pt declined HP this date.    Home Exercises Provided and Patient Education Provided     Education provided:   - Pt educated on importance of activity and exercise  - Pt/CG educated on proper technique and safety with exercises    Written Home Exercises Provided: Patient instructed to cont prior HEP.  Exercises were reviewed and Tran was able to demonstrate them prior to the end of the session.  Tran demonstrated good  understanding of the education provided.     See EMR under Patient Instructions for exercises provided prior visit.    Assessment     Pt cont to have significant difficulty following commands, which may be d/t reluctance to perform exercises as pt communicates well and demonstrates understanding upon communication. Required max verbal, visual, and tactile cues to perform exercises correctly t/o treatment. As a result, pt took extra time during each exercises d/t numerous corrections and education. Pt Required x2 breaks while performing nu-step to complete the 6 mins. Pt denies any increase in pain t/o treatment this date. Session was only 45 minutes as pt was unable to tolerate continued exercises.     Tran is progressing well towards her goals.   Pt prognosis is Good.     Pt will continue to benefit from skilled outpatient physical therapy to address the deficits listed in the problem list box on initial evaluation, provide pt/family education and to maximize pt's level of independence in the home and community environment.     Pt's spiritual, cultural and educational needs considered and pt agreeable to plan of care and goals.     Anticipated barriers to physical therapy: Chronic nature of pt pain    GOALS: Short Term Goals:  4 weeks  1. Report decreased in pain at worse less than  <   / =  4  /10  to increase tolerance for functional activities  2. Pt to increase B  popliteal angle by greater than > or = 5 degrees in order to improve flexibility and posture.   3. Increased B knee ext MMT 1/2  to increase tolerance for ADL and work activities.  4. Pt to tolerate HEP to improve ROM and independence with ADL's  5. Pt to improve range of motion by 25% to allow for improved functional mobility to allow for improvement in IADLs.   6. Pt will demonstrate improved score on tinetti to 12/28 in order to decrease risk for falls.     Long Term Goals: 8 weeks  1. Report decreased in pain at worse less than  <   / =  2  /10  to increase tolerance for functional mobility.   2 .Pt to increase B popliteal angle by greater than > or = 10 degrees in order to improve flexibility and posture.   3. Increased B knee ext MMT 1 grade to increase tolerance for ADL and work activities.  4. Pt will report at 60-80% or less limitation on FOTO  to demonstrate decrease in disability and improvement in back pain.  5. Pt to be Independent with HEP to improve ROM and independence with ADL's  6. Pt will demonstrate improved score on tinetti to 12/28 in order to decrease risk for falls.  7. Pt to report 0 falls in the past month in order to improve safety with fx mobility in the home.     Plan     Continue per MARIELOS Christiansen, PT

## 2019-04-01 ENCOUNTER — CLINICAL SUPPORT (OUTPATIENT)
Dept: REHABILITATION | Facility: HOSPITAL | Age: 74
End: 2019-04-01
Attending: INTERNAL MEDICINE
Payer: MEDICARE

## 2019-04-01 DIAGNOSIS — M25.561 CHRONIC PAIN OF BOTH KNEES: ICD-10-CM

## 2019-04-01 DIAGNOSIS — G89.29 CHRONIC MIDLINE LOW BACK PAIN WITHOUT SCIATICA: ICD-10-CM

## 2019-04-01 DIAGNOSIS — G89.29 CHRONIC PAIN OF BOTH KNEES: ICD-10-CM

## 2019-04-01 DIAGNOSIS — M54.50 CHRONIC MIDLINE LOW BACK PAIN WITHOUT SCIATICA: ICD-10-CM

## 2019-04-01 DIAGNOSIS — M25.562 CHRONIC PAIN OF BOTH KNEES: ICD-10-CM

## 2019-04-01 DIAGNOSIS — R26.81 UNSTEADY GAIT: ICD-10-CM

## 2019-04-01 PROCEDURE — 97110 THERAPEUTIC EXERCISES: CPT | Mod: PN

## 2019-04-01 NOTE — PROGRESS NOTES
"  Physical Therapy Daily Treatment Note     Name: Tran Bradley  Clinic Number: [id number]    Therapy Diagnosis:   Encounter Diagnoses   Name Primary?    Chronic pain of both knees     Unsteady gait     Chronic midline low back pain without sciatica      Physician: Jocelin Vargas*    Visit Date: 4/1/2019    Physician Orders: PT Eval and Treat   Medical Diagnosis from Referral: Gait disturbances; Osteoarthritis, unspecified osteoarthritis type, unspecified site; Back pain, unspecified back location, unspecified back pain laterality, unspecified chronicity;  Evaluation Date: 3/20/2019  Authorization Period Expiration: 2/19/2020  Plan of Care Expiration: 5/20/2019  Visit # / Visits authorized: 2/29     Time In: 1341   Time Out: 1430  Total Billable Time: 19 minutes     Precautions: Standard and Fall    Subjective     Pt reports: Patient arrives to clinic using FWW, visible knee buckling noted. Patient reports they had a fall last week. Family member present and reports the knee buckling is new, did not have this before. Supervising PT met with patient at start of session.     She was compliant with home exercise program.  Response to previous treatment: Nothing to report  Functional change: None at this time    Pain: 0/10  Location: Central back      Objective     Tran received therapeutic exercises to develop strength, ROM and flexibility for 49 minutes including:    NU step x6' level 1  2x10 sit<>stand from mat    HS stretch with strap 2x20" BLE  BLE SLR 2x10   BLE LAQ 2x10  Seated marching x20  Ankle pumps x20  Hip add with ball in hooklying x30  Hip abd with RTB in hooklying x20   +Chair push up x 7     Tran received the following manual therapy techniques: Joint mobilizations, Manual traction and Soft tissue Mobilization were applied to the: L knee for 00 minutes, including:      Tran participated in neuromuscular re-education activities to improve: Balance, Coordination, Proprioception and " "Posture for 00 minutes. The following activities were included:      Tarn participated in gait training to improve functional mobility and safety for 00  minutes, including:      Tran received hot pack for knee minutes to 00. Pt declined HP this date.    Home Exercises Provided and Patient Education Provided     Education provided:   - Pt educated on importance of activity and exercise  - Pt/CG educated on proper technique and safety with exercises    Written Home Exercises Provided: Patient instructed to cont prior HEP.  Exercises were reviewed and Tran was able to demonstrate them prior to the end of the session.  Tran demonstrated good  understanding of the education provided.     See EMR under Patient Instructions for exercises provided prior visit.    Assessment     Today's session modified for patient tolerance to activity. End of session staff suggested patient use WC to return to car to avoid knee buckling and having fall. Rest breaks required throughout today due to fatigue. Patient requires frequent cues for technique during session. Education completed with family member regarding fatigue and modified session today due to them asking "that's it?" when session was over. Also explained scheduled patient time to 2:30 today.     Tran is progressing well towards her goals.   Pt prognosis is Good.     Pt will continue to benefit from skilled outpatient physical therapy to address the deficits listed in the problem list box on initial evaluation, provide pt/family education and to maximize pt's level of independence in the home and community environment.     Pt's spiritual, cultural and educational needs considered and pt agreeable to plan of care and goals.     Anticipated barriers to physical therapy: Chronic nature of pt pain    GOALS: Short Term Goals:  4 weeks  1. Report decreased in pain at worse less than  <   / =  4  /10  to increase tolerance for functional activities  2. Pt to increase B " popliteal angle by greater than > or = 5 degrees in order to improve flexibility and posture.   3. Increased B knee ext MMT 1/2  to increase tolerance for ADL and work activities.  4. Pt to tolerate HEP to improve ROM and independence with ADL's  5. Pt to improve range of motion by 25% to allow for improved functional mobility to allow for improvement in IADLs.   6. Pt will demonstrate improved score on tinetti to 12/28 in order to decrease risk for falls.     Long Term Goals: 8 weeks  1. Report decreased in pain at worse less than  <   / =  2  /10  to increase tolerance for functional mobility.   2 .Pt to increase B popliteal angle by greater than > or = 10 degrees in order to improve flexibility and posture.   3. Increased B knee ext MMT 1 grade to increase tolerance for ADL and work activities.  4. Pt will report at 60-80% or less limitation on FOTO  to demonstrate decrease in disability and improvement in back pain.  5. Pt to be Independent with HEP to improve ROM and independence with ADL's  6. Pt will demonstrate improved score on tinetti to 12/28 in order to decrease risk for falls.  7. Pt to report 0 falls in the past month in order to improve safety with fx mobility in the home.     Plan     Continue per POC    Ashutosh Nguyen, PTA

## 2019-04-11 ENCOUNTER — DOCUMENTATION ONLY (OUTPATIENT)
Dept: REHABILITATION | Facility: HOSPITAL | Age: 74
End: 2019-04-11

## 2019-04-11 NOTE — PROGRESS NOTES
Patient no-showed today's physical therapy appointment; appointment was for 11:00 4/11/2019.    Number of no shows: 2  Number of cancellations: 0    Zach Christiansen, PT, DPT

## 2019-04-16 ENCOUNTER — DOCUMENTATION ONLY (OUTPATIENT)
Dept: REHABILITATION | Facility: HOSPITAL | Age: 74
End: 2019-04-16

## 2019-04-16 NOTE — PROGRESS NOTES
Missed Visit/Cancellation     Date: 4/16/19    Canceled Number: 0  No Show Number: 3             Pt initially had visit scheduled for today for 1230 .  Pt has no showed her last 3 appointments.  Pt's next scheduled visit is 4/18/19 at 1200.  Front office spoke with pt's sister in regards to patient missing appointments.  Per sister, appointment was not on patients schedule.  Front office spoke with pt's sister in regards to missed visits and importance of attending next visit.  Speak with pt in regards to [facility] policy next visit.

## 2019-04-17 ENCOUNTER — OFFICE VISIT (OUTPATIENT)
Dept: UROLOGY | Facility: CLINIC | Age: 74
End: 2019-04-17
Payer: MEDICARE

## 2019-04-17 VITALS
BODY MASS INDEX: 24.13 KG/M2 | SYSTOLIC BLOOD PRESSURE: 142 MMHG | HEART RATE: 88 BPM | HEIGHT: 65 IN | DIASTOLIC BLOOD PRESSURE: 86 MMHG

## 2019-04-17 DIAGNOSIS — N39.41 URGE INCONTINENCE: Primary | ICD-10-CM

## 2019-04-17 PROCEDURE — 99213 OFFICE O/P EST LOW 20 MIN: CPT | Mod: PBBFAC | Performed by: UROLOGY

## 2019-04-17 PROCEDURE — 99213 PR OFFICE/OUTPT VISIT, EST, LEVL III, 20-29 MIN: ICD-10-PCS | Mod: S$PBB,,, | Performed by: UROLOGY

## 2019-04-17 PROCEDURE — 99999 PR PBB SHADOW E&M-EST. PATIENT-LVL III: CPT | Mod: PBBFAC,,, | Performed by: UROLOGY

## 2019-04-17 PROCEDURE — 99999 PR PBB SHADOW E&M-EST. PATIENT-LVL III: ICD-10-PCS | Mod: PBBFAC,,, | Performed by: UROLOGY

## 2019-04-17 PROCEDURE — 99213 OFFICE O/P EST LOW 20 MIN: CPT | Mod: S$PBB,,, | Performed by: UROLOGY

## 2019-04-17 RX ORDER — LIDOCAINE HYDROCHLORIDE 20 MG/ML
JELLY TOPICAL ONCE
Status: CANCELLED | OUTPATIENT
Start: 2019-04-17 | End: 2019-04-17

## 2019-04-17 RX ORDER — DOXYCYCLINE HYCLATE 100 MG
100 TABLET ORAL ONCE
Status: CANCELLED | OUTPATIENT
Start: 2019-04-17 | End: 2019-04-17

## 2019-04-17 NOTE — PROGRESS NOTES
CHIEF COMPLAINT:    Mrs. Bradley is a 73 y.o. female presenting for a follow up on urge incontinence    PRESENTING ILLNESS:    Tran Bradley is a 73 y.o. female with a PMH of urge incontinence for at least 4 years who complains of difficulty emptying along with bouts of urgency.  One month ago the patient was prescribed Myrbetriq, but has had a poor response to therapy.  She states it helped her minimally.  She still has episodes of urge incontinence.      REVIEW OF SYSTEMS:    Review of Systems   Constitutional: Negative.    HENT: Negative.    Eyes: Negative.    Respiratory: Negative.    Cardiovascular: Negative.    Gastrointestinal: Negative.    Genitourinary: Positive for urgency.   Musculoskeletal: Positive for myalgias.        Uses a wheel chair   Skin: Negative.    Neurological: Negative.    Endo/Heme/Allergies: Negative.    Psychiatric/Behavioral: The patient is nervous/anxious.        PATIENT HISTORY:    Past Medical History:   Diagnosis Date    Anxiety     Arthritis     Depression     Hypertension        Past Surgical History:   Procedure Laterality Date    BUNIONECTOMY      bilateral        Family History   Problem Relation Age of Onset    Hypertension Mother     Heart disease Mother     Hypertension Father     Asthma Father     Heart disease Father     Hypertension Sister     COPD Brother     Heart disease Brother      Socioeconomic History    Marital status:    Tobacco Use    Smoking status: Never Smoker    Smokeless tobacco: Never Used   Substance and Sexual Activity    Alcohol use: No    Drug use: No    Sexual activity: Not on file       Allergies:  Patient has no known allergies.    Medications:  Outpatient Encounter Medications as of 4/17/2019   Medication Sig Dispense Refill    amLODIPine (NORVASC) 10 MG tablet Take 1 tablet (10 mg total) by mouth once daily. 90 tablet 3    diclofenac sodium (VOLTAREN) 1 % Gel Apply 2 g topically daily as needed. 100 g 6    mirabegron  (MYRBETRIQ) 50 mg Tb24 Take 1 tablet (50 mg total) by mouth once daily. 30 tablet 11    sertraline (ZOLOFT) 50 MG tablet Take 1 tablet (50 mg total) by mouth once daily. 90 tablet 3    rosuvastatin (CRESTOR) 10 MG tablet Take 1 tablet (10 mg total) by mouth once daily. 90 tablet 3     No facility-administered encounter medications on file as of 4/17/2019.          PHYSICAL EXAMINATION:    The patient generally appears in good health, is appropriately interactive, and is in no apparent distress.    Skin: No lesions.    Mental: Cooperative with normal affect.    Neuro: Grossly intact.    HEENT: Normal. No evidence of lymphadenopathy.    Chest:  normal inspiratory effort.    Abdomen:  Soft, non-tender. No masses or organomegaly. Bladder is not palpable. No evidence of flank discomfort. No evidence of inguinal hernia.    Extremities: No clubbing, cyanosis, or edema      LABS:    Lab Results   Component Value Date    BUN 15 02/12/2019    CREATININE 0.9 02/12/2019     Could not void    IMPRESSION:    Encounter Diagnoses   Name Primary?    Urge incontinence Yes       PLAN:    1.  For urodynamics to see if she is a candidate for tertiary therapy.

## 2019-04-17 NOTE — PATIENT INSTRUCTIONS
Urodynamics Studies     The bladder holds urine until it leaves the body through the urethra.     Urodynamics studies are a series of tests that give your doctor a close look at the working of your bladder and urethra. The tests can help your doctor learn about any problems storing urine or voiding (eliminating) urine from your body.  Understanding the lower urinary tract  The lower part of the urinary tract has several parts.  · The bladder stores urine until youre ready to release it.  · The urethra is the tube that carries urine from the bladder out of the body.  · The sphincter is made up of muscles around the opening of the bladder. The sphincter muscles tighten to hold urine in the bladder. They relax to let urine flow. Signals from the brain tell the sphincter when to tighten and relax. These signals also tell the bladder when to contract to let urine flow out of the body.  Why you need a urodynamics study  This test may be ordered if you:  · Are incontinent (leak urine)  · Have a bladder that does not empty all the way.  · Have symptoms such as the need to urinate often or a constant strong need to urinate  · Have intermittent or weak urine stream  · Have persistent urinary tract infections  Preparing for the study  · Tell your doctor about any medicine youre taking. Ask if you should stop them before the study.  · Keep a diary of your bathroom habits. Do this for a few days before the study. This diary can be a helpful part of the evaluation.  · Ask if you need to arrive for the study with a full bladder.  Date Last Reviewed: 1/1/2017  © 1982-9888 [employer]. [address] [place] [address], [place] [address]. All rights reserved. This information is not intended as a substitute for professional medical care. Always follow your healthcare professional's instructions.          Urodynamic Studies     The equipment used for the study varies depending upon the facility and what tests are done.      Urodynamic studies may be done in your doctors office, a clinic, or a hospital. The studies may take up to an hour or more. This depends on which tests your doctor does. The tests are generally painless. You wont need sedating medicine.  Tests that may be done  Uroflowmetry. This measures the amount and speed of urine you void from your bladder. You urinate into a funnel. Its attached to a computer that records your urine flow over time. The amount of urine left in your bladder after you void may also be measured right after this test.  Cystometry. This test evaluates how much your bladder can hold. It also measures how strong your bladder muscle is and how well the signals work that tell you when your bladder is full. Your healthcare provider fills your bladder with sterile water or saline solution, through a catheter. Your doctor will instruct you to report any sensations you feel. Mention if theyre similar to symptoms youve felt at home. Your doctor may ask you to cough, stand and walk, or bear down during this test.  Electromyogram. This helps evaluate the muscle contractions that control urination, such as sphincter muscle contractions. Your healthcare provider may place electrode patches or wires near your rectum or urethra to make the recording. He or she may ask you to try to tighten or relax your sphincter muscles during this test.  Pressure flow study. This test measures your detrusor, urethral, and abdominal pressures. Detrusor is the muscle surrounding the bladder walls that relaxes to allow your bladder to fill, and and contracts to squeeze out urine. A pressure flow study is often done after cystometry. Youre asked to urinate while a probe in your urethra measures pressures.  Video cystourethrography. This takes video pictures of urine flow through your urinary tract. It can help identify blockages or other problems. The bladder is filled with an X-ray contrast fluid. Then X-ray video  pictures are taken as the fluid is urinated out. Ultrasound imaging may also be combined with routine urodynamic studies.  Ambulatory urodynamics. This test can be used to evaluate you while doing usual activities.  Getting your results  After the study, youll get dressed and return to the consultation room. Test results may be ready soon after the study is finished. Or, you may return to your doctors office in a few days for your results. Your doctor can talk with you about the study report and your options.   Date Last Reviewed: 1/1/2017 © 2000-2017 [employer]. [address] [place] [address]. All rights reserved. This information is not intended as a substitute for professional medical care. Always follow your healthcare professional's instructions.          Cystoscopy    Cystoscopy is a procedure that lets your doctor look directly inside your urethra and bladder. It can be used to:  · Help diagnose a problem with your urethra, bladder, or kidneys.  · Take a sample (biopsy) of bladder or urethral tissue.  · Treat certain problems (such as removing kidney stones).  · Place a stent to bypass an obstruction.  · Take special X-rays of the kidneys.  Based on the findings, your doctor may recommend other tests or treatments.  What is a cystoscope?  A cystoscope is a telescope-like instrument that contains lenses and fiberoptics (small glass wires that make bright light). The cystoscope may be straight and rigid, or flexible to bend around curves in the urethra. The doctor may look directly into the cystoscope, or project the image onto a monitor.  Getting ready  · Ask your doctor if you should stop taking any medicines before the procedure.  · Ask whether you should avoid eating or drinking anything after midnight before the procedure.  · Follow any other instructions your doctor gives you.  Tell your doctor before the exam if you:  · Take any medicines, such as aspirin or blood  thinners  · Have allergies to any medicines  · Are pregnant   The procedure  Cystoscopy is done in the doctors office, surgery center, or hospital. The doctor and a nurse are present during the procedure. It takes only a few minutes, longer if a biopsy, X-ray, or treatment needs to be done.  During the procedure:  · You lie on an exam table on your back, knees bent and legs apart. You are covered with a drape.  · Your urethra and the area around it are washed. Anesthetic jelly may be applied to numb the urethra. Other pain medicine is usually not needed. In some cases, you may be offered a mild sedative to help you relax. If a more extensive procedure is to be done, such as a biopsy or kidney stone removal, general anesthesia may be needed.  · The cystoscope is inserted. A sterile fluid is put into the bladder to expand it. You may feel pressure from this fluid.  · When the procedure is done, the cystoscope is removed.  After the procedure  If you had a sedative, general anesthesia, or spinal anesthesia, you must have someone drive you home. Once youre home:  · Drink plenty of fluids.  · You may have burning or light bleeding when you urinate--this is normal.  · Medicines may be prescribed to ease any discomfort or prevent infection. Take these as directed.  · Call your doctor if you have heavy bleeding or blood clots, burning that lasts more than a day, a fever over 100°F  (38° C), or trouble urinating.  Date Last Reviewed: 1/1/2017  © 6299-5064 [employer]. [address] [place] [address], [place] [address]. All rights reserved. This information is not intended as a substitute for professional medical care. Always follow your healthcare professional's instructions.

## 2019-05-08 ENCOUNTER — PROCEDURE VISIT (OUTPATIENT)
Dept: UROLOGY | Facility: CLINIC | Age: 74
End: 2019-05-08
Payer: MEDICARE

## 2019-05-08 VITALS
WEIGHT: 135 LBS | TEMPERATURE: 98 F | HEIGHT: 65 IN | HEART RATE: 76 BPM | DIASTOLIC BLOOD PRESSURE: 94 MMHG | SYSTOLIC BLOOD PRESSURE: 140 MMHG | BODY MASS INDEX: 22.49 KG/M2

## 2019-05-08 DIAGNOSIS — R41.3 MEMORY LOSS: Primary | ICD-10-CM

## 2019-05-08 DIAGNOSIS — N39.41 URGE INCONTINENCE: ICD-10-CM

## 2019-05-08 PROCEDURE — 51728 CYSTOMETROGRAM W/VP: CPT | Mod: 26,S$PBB,, | Performed by: UROLOGY

## 2019-05-08 PROCEDURE — 51797 INTRAABDOMINAL PRESSURE TEST: CPT | Mod: PBBFAC | Performed by: UROLOGY

## 2019-05-08 PROCEDURE — 52000 CYSTOURETHROSCOPY: CPT | Mod: PBBFAC | Performed by: UROLOGY

## 2019-05-08 PROCEDURE — 51728 PR COMPLEX CYSTOMETROGRAM VOIDING PRESSURE STUDIES: ICD-10-PCS | Mod: 26,S$PBB,, | Performed by: UROLOGY

## 2019-05-08 PROCEDURE — 52000 PR CYSTOURETHROSCOPY: ICD-10-PCS | Mod: S$PBB,59,, | Performed by: UROLOGY

## 2019-05-08 PROCEDURE — 51784 ANAL/URINARY MUSCLE STUDY: CPT | Mod: 26,S$PBB,51, | Performed by: UROLOGY

## 2019-05-08 PROCEDURE — 51784 PR ANAL/URINARY MUSCLE STUDY: ICD-10-PCS | Mod: 26,S$PBB,51, | Performed by: UROLOGY

## 2019-05-08 PROCEDURE — 52000 CYSTOURETHROSCOPY: CPT | Mod: S$PBB,59,, | Performed by: UROLOGY

## 2019-05-08 PROCEDURE — 51736 URINE FLOW MEASUREMENT: CPT | Mod: PBBFAC | Performed by: UROLOGY

## 2019-05-08 PROCEDURE — 51728 CYSTOMETROGRAM W/VP: CPT | Mod: PBBFAC | Performed by: UROLOGY

## 2019-05-08 PROCEDURE — 51784 ANAL/URINARY MUSCLE STUDY: CPT | Mod: PBBFAC | Performed by: UROLOGY

## 2019-05-08 RX ORDER — LIDOCAINE HYDROCHLORIDE 20 MG/ML
JELLY TOPICAL ONCE
Status: COMPLETED | OUTPATIENT
Start: 2019-05-08 | End: 2019-05-08

## 2019-05-08 RX ORDER — DOXYCYCLINE HYCLATE 100 MG
100 TABLET ORAL ONCE
Status: COMPLETED | OUTPATIENT
Start: 2019-05-08 | End: 2019-05-08

## 2019-05-08 RX ADMIN — Medication 100 MG: at 03:05

## 2019-05-08 RX ADMIN — LIDOCAINE HYDROCHLORIDE: 20 JELLY TOPICAL at 03:05

## 2019-05-08 NOTE — PROCEDURES
Procedures     Urodynamic Report    Indication:  Urge incontinence, refractory to oral medications.      Patient was taken to the Urodynamic Suite with a comfortably full bladder and asked to perform a free uroflow.  Next, the patient was prepped and the urinary residual was drained with a 14 Fr catheter.  A 7 Fr dual lumen catheter was placed to measure intravesical pressures.  A 10 Fr balloon manometer was placed into the rectum for abdominal pressure measurements.  Patch EMG electrodes were placed on the perineum.  The patient was connected to the [employer] Urodynamic machine, using a multichannel technique, the data were interpreted.  The bladder was filled with sterile water at room temperature at a rate of 30, then 20 ml/min.  Patient is filled to urge incontinence.  Filling is performed with the patient in the seated position.  The patient was then asked to sit and void for a pressure flow study.    The following are the results of the study:  1.  Uroflow       Q max:  Unable to void    2.  Amount of urine in the bladder:  60 ml    3.  CMG       Sensation:         First Desire:  105 ml         Normal Desire:           Strong Desire:         Urgency:       Capacity:       Abnormal Contractions:  Had small DO at 121, then large DO at 149 ml infused       Compliance:  normal    4.  EMG:  Normal guarding reflex, increased during voiding (this appears to be an artifact, likely that the EMG lead became wet)    6.  Voiding phase       Q max:  30.3 ml/sec       P det at Q max:  54.3 cm H2O       Pattern of the curve:  Intermittent, with the theresa above bseline       Voided volume:  147.5 ml       PVR:  10 ml    7.  Analysis:  Significant DO, but empties well    8.  Recommendations:       A.  See cystoscopy       B.  The patient has a significant memory disorder.         C.  Discussed the findings with her sister Rachelle and her daughter, who wanted to discuss this with her sister, who is a nurse.  [employer] Brochure was  provided, along with Agnes's card.  Will give her 2 dates, one for surgery and one for a pre op and to sign consents.  Discussed that one of her children who has power of  needs to come to sign the consents.  Can also come on the day of surgery as an anesthesia consent will need to be signed.       CYSTOSCOPY REPORT    Pre Procedure Diagnosis:  Detrusor overactivity    Post Procedure Diagnosis: normal lower urinary tract    Anesthesia: 10 cc 2% lidocaine jelly applied per urethra.    14 FR Flexible Olympus cystoscope used.    FINDINGS:  Dome, anterior, posterior, lateral walls and bladder base free of urothelial abnormalities. Right and left ureteral orifices in the normal postion and configuration, both effluxed clear urine.  Bladder neck and urethra were normal.  Diminished bladder capacity    Specimen:  none    The patient was taken to the cystoscopy suite and placed in dorsal lithotomy position.  The genitalia was prepped and draped  in the usual sterile fashion.  Two percent lidocaine jelly was inserted in the urethra.  After sufficent time had passed to allow good local anesthesia, the cystoscope was inserted in the urethra and passed into the bladder visualizing the urethra along its entire course.  The dome, anterior, posterior and lateral walls were examined systematically.  The ureteral orifices were in their usual position and configuration.  The cystoscope was turned upon itself 180 degrees to visualize the bladder neck.  The cystoscope was then brought to the level of the bladder neck, the water was turned on and the urethra was visualized.  The cystoscope was removed and the patient was instructed to urinate prior to leaving the office.     Post procedure medication:  Doxycycline 100 mg x 1     ASSESSMENT/PLAN:  73 year old woman status post flexible cystoscopy.  1. Push fluids for 24 hours.  2. May see blood in the urine, this should gradually improve over the next 2-3 days.  3. The patient  was instructed to return to the office or go to the emergency should fever, chills, cloudy urine, or inability to urinate develop.  4. Follow up pending decision with the patient's family.

## 2019-05-08 NOTE — PATIENT INSTRUCTIONS
SIMPLE URODYNAMIC STUDY (SUDS) & CYSTOSCOPY  UROLOGY CLINIC DISCHARGE INSTRUCTIONS    You have had a procedure that will require time to properly heal. Follow the instructions you have been given on how to care for yourself once you are home. Below is additional information to help in your recovery.    ACTIVITY  · There are no restrictions in activity. Start doing again the things you did before the procedure.  · You may experience a slight burning sensation. You may notice a small amount of blood in your urine. This will clear up within a day. Call the clinic if this continues beyond 48 hours.    DIET  · Continue your normal diet. You may eat the same foods you ate before your procedure.  · Drink plenty of fluids during the first 24-48 hours following your procedure.    MEDICATIONS  · Resume all other previous medications from your prescribing physician.  · Continue any pre=procedure antibiotics until they are all gone.    SIGNS AND SYMPTOMS TO REPORT TO THE DOCTOR  · Chills or fever greater than 101° F within 24 hours of procedure.  · Changes in urination, such as increased bleeding, foul smell, cloudy urine, or painful urination.  · Call your doctor with any questions or concerns.    For any emergency situation, call [phone] immediately or go to your nearest emergency room.    [facility]  [phone]    _                                                                                                                                                                                             If any problems after hours or weekends, you may call [phone] and ask for the urology resident on call.

## 2019-08-01 DIAGNOSIS — Z12.11 COLON CANCER SCREENING: ICD-10-CM

## 2019-08-09 ENCOUNTER — OFFICE VISIT (OUTPATIENT)
Dept: ORTHOPEDICS | Facility: CLINIC | Age: 74
End: 2019-08-09
Payer: MEDICARE

## 2019-08-09 ENCOUNTER — OFFICE VISIT (OUTPATIENT)
Dept: INTERNAL MEDICINE | Facility: CLINIC | Age: 74
End: 2019-08-09
Payer: MEDICARE

## 2019-08-09 ENCOUNTER — HOSPITAL ENCOUNTER (OUTPATIENT)
Dept: RADIOLOGY | Facility: HOSPITAL | Age: 74
Discharge: HOME | End: 2019-08-09
Attending: PHYSICIAN ASSISTANT
Payer: MEDICARE

## 2019-08-09 VITALS
SYSTOLIC BLOOD PRESSURE: 136 MMHG | BODY MASS INDEX: 24.06 KG/M2 | HEIGHT: 65 IN | TEMPERATURE: 98 F | WEIGHT: 144.38 LBS | DIASTOLIC BLOOD PRESSURE: 86 MMHG | OXYGEN SATURATION: 85 % | HEART RATE: 99 BPM

## 2019-08-09 VITALS
DIASTOLIC BLOOD PRESSURE: 73 MMHG | SYSTOLIC BLOOD PRESSURE: 122 MMHG | HEART RATE: 67 BPM | HEIGHT: 65 IN | WEIGHT: 144 LBS | BODY MASS INDEX: 23.99 KG/M2

## 2019-08-09 DIAGNOSIS — M25.561 PAIN IN BOTH KNEES, UNSPECIFIED CHRONICITY: ICD-10-CM

## 2019-08-09 DIAGNOSIS — M25.462 PAIN AND SWELLING OF LEFT KNEE: Primary | ICD-10-CM

## 2019-08-09 DIAGNOSIS — M17.0 PRIMARY OSTEOARTHRITIS OF BOTH KNEES: Primary | ICD-10-CM

## 2019-08-09 DIAGNOSIS — M25.562 PAIN IN BOTH KNEES, UNSPECIFIED CHRONICITY: ICD-10-CM

## 2019-08-09 DIAGNOSIS — M25.562 PAIN AND SWELLING OF LEFT KNEE: Primary | ICD-10-CM

## 2019-08-09 PROCEDURE — 99999 PR PBB SHADOW E&M-EST. PATIENT-LVL III: ICD-10-PCS | Mod: PBBFAC,,, | Performed by: INTERNAL MEDICINE

## 2019-08-09 PROCEDURE — 99999 PR PBB SHADOW E&M-EST. PATIENT-LVL III: CPT | Mod: PBBFAC,,, | Performed by: PHYSICIAN ASSISTANT

## 2019-08-09 PROCEDURE — 99999 PR PBB SHADOW E&M-EST. PATIENT-LVL III: ICD-10-PCS | Mod: PBBFAC,,, | Performed by: PHYSICIAN ASSISTANT

## 2019-08-09 PROCEDURE — 99213 PR OFFICE/OUTPT VISIT, EST, LEVL III, 20-29 MIN: ICD-10-PCS | Mod: S$PBB,,, | Performed by: PHYSICIAN ASSISTANT

## 2019-08-09 PROCEDURE — 99213 OFFICE O/P EST LOW 20 MIN: CPT | Mod: S$PBB,,, | Performed by: INTERNAL MEDICINE

## 2019-08-09 PROCEDURE — 99213 OFFICE O/P EST LOW 20 MIN: CPT | Mod: S$PBB,,, | Performed by: PHYSICIAN ASSISTANT

## 2019-08-09 PROCEDURE — 73562 X-RAY EXAM OF KNEE 3: CPT | Mod: 50,TC

## 2019-08-09 PROCEDURE — 99213 OFFICE O/P EST LOW 20 MIN: CPT | Mod: PBBFAC,25,27 | Performed by: PHYSICIAN ASSISTANT

## 2019-08-09 PROCEDURE — 73562 XR KNEE 3 VIEW BILATERAL: ICD-10-PCS | Mod: 26,50,, | Performed by: RADIOLOGY

## 2019-08-09 PROCEDURE — 99213 OFFICE O/P EST LOW 20 MIN: CPT | Mod: PBBFAC | Performed by: INTERNAL MEDICINE

## 2019-08-09 PROCEDURE — 73562 X-RAY EXAM OF KNEE 3: CPT | Mod: 26,50,, | Performed by: RADIOLOGY

## 2019-08-09 PROCEDURE — 99999 PR PBB SHADOW E&M-EST. PATIENT-LVL III: CPT | Mod: PBBFAC,,, | Performed by: INTERNAL MEDICINE

## 2019-08-09 PROCEDURE — 99213 PR OFFICE/OUTPT VISIT, EST, LEVL III, 20-29 MIN: ICD-10-PCS | Mod: S$PBB,,, | Performed by: INTERNAL MEDICINE

## 2019-08-09 RX ORDER — MELOXICAM 7.5 MG/1
7.5 TABLET ORAL DAILY
Qty: 14 TABLET | Refills: 0 | Status: SHIPPED | OUTPATIENT
Start: 2019-08-09 | End: 2019-08-23

## 2019-08-09 NOTE — PROGRESS NOTES
Subjective:       Patient ID: Tran Bradley is a 73 y.o. female.    Chief Complaint: Knee Pain (bilateral)    73 year old lady complains of increasing pain in left knee.  It gives out under her fairly often so she must use a walker to walk.  Has history of arthritis      Review of Systems   Constitutional: Negative for activity change, chills, fatigue and fever.   HENT: Negative for congestion, ear pain, nosebleeds, postnasal drip, sinus pressure and sore throat.    Eyes: Negative.  Negative for visual disturbance.   Respiratory: Negative for cough, chest tightness, shortness of breath and wheezing.    Cardiovascular: Negative for chest pain.   Gastrointestinal: Negative for abdominal pain, diarrhea, nausea and vomiting.   Genitourinary: Negative for difficulty urinating, dysuria, frequency and urgency.   Musculoskeletal: Negative for arthralgias and neck stiffness.   Skin: Negative for rash.   Neurological: Negative for dizziness, weakness and headaches.   Psychiatric/Behavioral: Negative for sleep disturbance. The patient is not nervous/anxious.        Objective:      Physical Exam   Musculoskeletal:        Left knee: She exhibits decreased range of motion, swelling and deformity. She exhibits no effusion, no ecchymosis, no laceration, no erythema, normal alignment, no LCL laxity and no MCL laxity. Tenderness found.       Assessment:       1. Pain and swelling of left knee        Plan:   Tran was seen today for knee pain.    Diagnoses and all orders for this visit:    Pain and swelling of left knee  -     Ambulatory consult to Orthopedics    Other orders  -     meloxicam (MOBIC) 7.5 MG tablet; Take 1 tablet (7.5 mg total) by mouth once daily. for 14 days

## 2019-08-09 NOTE — LETTER
August 9, 2019      Keely Gong MD  [address]  [facility] [address]           [facility]oped[facility]  [address]  [facility] [address]  Phone: [phone]          Patient: Tran Bradley   MR Number: [id number]   YOB: 1945   Date of Visit: 8/9/2019       Dear Dr. Keely Gong:    Thank you for referring Tran Bradley to me for evaluation. Attached you will find relevant portions of my assessment and plan of care.    If you have questions, please do not hesitate to call me. I look forward to following Tran Bradley along with you.    Sincerely,    Aguila Rogers PA-C    Enclosure  CC:  No Recipients    If you would like to receive this communication electronically, please contact [email or web address] or [phone] to request more information on [employer] access.    For providers and/or their staff who would like to refer a patient to [facility], please contact us through our one-stop-shop provider referral line, [facility], at [phone].    If you feel you have received this communication in error or would no longer like to receive these types of communications, please e-mail [email or web address]

## 2019-08-09 NOTE — PROGRESS NOTES
SUBJECTIVE:     Chief Complaint & History of Present Illness:  Tran Bradley is a 73 y.o. year old female, established patient, here with a history of constant bilateral knee pain (left worse than right) which started 5 years ago.  There is not a history of trauma.  The pain is located in the global aspect of the knee.  The pain is described as achy, 10/10.  There is not radiation.  There is not catching or locking as patient is not able to bear weight without pain.  Aggravating factors include any weight bearing.  Associated symptoms include effusion, crepitus sensation.  There is not numbness or tingling of the lower extremity.  There is not back pain. Previous treatments include rest, NSAIDS and cortisone injection which have provided minimal relief.  There is not a history of previous injury or surgery to the knee.  The patient does use an assistive device.    Patient seen by Dr. Gong prior to this appointment.  She was prescribed mobic 7.5mg for bilateral knee pain then referred to orthopedics for further follow up.    Review of patient's allergies indicates:  No Known Allergies      Current Outpatient Medications   Medication Sig Dispense Refill    amLODIPine (NORVASC) 10 MG tablet Take 1 tablet (10 mg total) by mouth once daily. 90 tablet 3    diclofenac sodium (VOLTAREN) 1 % Gel Apply 2 g topically daily as needed. 100 g 6    meloxicam (MOBIC) 7.5 MG tablet Take 1 tablet (7.5 mg total) by mouth once daily. for 14 days 14 tablet 0    mirabegron (MYRBETRIQ) 50 mg Tb24 Take 1 tablet (50 mg total) by mouth once daily. 30 tablet 11    sertraline (ZOLOFT) 50 MG tablet Take 1 tablet (50 mg total) by mouth once daily. 90 tablet 3    rosuvastatin (CRESTOR) 10 MG tablet Take 1 tablet (10 mg total) by mouth once daily. 90 tablet 3     No current facility-administered medications for this visit.        Past Medical History:   Diagnosis Date    Anxiety     Arthritis     Depression     Hypertension        Past  "Surgical History:   Procedure Laterality Date    BUNIONECTOMY      bilateral        Vital Signs (Most Recent)  There were no vitals filed for this visit.        Review of Systems:  ROS:  Constitutional: no fever or chills  Eyes: no visual changes  ENT: no nasal congestion or sore throat  Respiratory: no cough or shortness of breath  Cardiovascular: no chest pain or palpitations  Gastrointestinal: no nausea or vomiting, tolerating diet  Genitourinary: no hematuria or dysuria  Integument/Breast: no rash or pruritis  Hematologic/Lymphatic: no easy bruising or lymphadenopathy  Musculoskeletal: positive for bilateral knee pain  Neurological: no seizures or tremors, positive for memory loss  Behavioral/Psych: no auditory or visual hallucinations  Endocrine: no heat or cold intolerance    OBJECTIVE:     PHYSICAL EXAM:  Height: 5' 5" (165.1 cm) Weight: 65.3 kg (144 lb), General Appearance: Well nourished, well developed, in no acute distress.  Neurological: Mood & affect are normal.  left  Knee Exam:  Knee Range of Motion:10-70 degrees flexion   Effusion:not significant  Condition of skin:intact  Location of tenderness:Medial joint line, Lateral joint line and Patella   Strength:2 of 5  Stability:  Lachman: stable, LCL: stable, MCL: stable, PCL: stable and posteromedial (dial): stable  Varus /Valgus stress:  normal  Perri:   negative/negative    right  Knee Exam:  Knee Range of Motion:5-95 degrees flexion   Effusion:none  Condition of skin:intact  Location of tenderness:Medial joint line and Lateral joint line   Strength:3 of 5  Stability:  Lachman: stable, LCL: stable, MCL: stable, PCL: stable and posteromedial (dial): stable  Varus /Valgus stress:  normal  Perri:   negative/negative    RADIOGRAPHS:  Xray of bilateral knee taken in clinic today, images reviewed by me, demonstrates severe tricompartmental narrowing of bilateral knee.    ASSESSMENT/PLAN:     Plan: We discussed with the patient at length all the " different treatment options available for the knee including anti-inflammatories, acetaminophen, rest, ice, knee strengthening exercise, occasional cortisone injections for temporary relief, Viscosupplimentation injections, arthroscopic debridement osteotomy, and finally knee arthroplasty.   I advised patient that her best option would be a knee replacement due to previous conservative treatment methods attempted without relief.  Patient is hesitant on this and would like to think about it over the weekend.  She states she will contact me on Monday with her decision.  I advised in the meantime she should take Meloxicam as prescribed by Dr. Gong.  Rest, ice and elevate the knees as needed.  She is to follow up once she makes a decision about her knee replacement surgery.  Patient verbalized understanding.    Final Diagnosis: Primary osteoarthritis knee, bilateral. Bilateral knee pain.

## 2019-08-13 ENCOUNTER — TELEPHONE (OUTPATIENT)
Dept: ORTHOPEDICS | Facility: CLINIC | Age: 74
End: 2019-08-13

## 2019-08-13 NOTE — TELEPHONE ENCOUNTER
Spoke to pt's daughter and she stated she would like to schedule her mother's consult to discuss knee surgery. Pt is booked 8/19/19 at 10:40am. Pt's daughter verbalized understanding and had no further concerns.----- Message from Qi Garcia sent at 8/13/2019  4:01 PM CDT -----  Contact: Rina ( daughter)  Rina would like a call back top set up a consultation to discuss the pt having surgery @ [phone]

## 2019-08-19 ENCOUNTER — OFFICE VISIT (OUTPATIENT)
Dept: ORTHOPEDICS | Facility: CLINIC | Age: 74
End: 2019-08-19
Payer: MEDICARE

## 2019-08-19 VITALS — BODY MASS INDEX: 24.02 KG/M2 | HEIGHT: 65 IN | WEIGHT: 144.19 LBS

## 2019-08-19 DIAGNOSIS — M17.12 PRIMARY OSTEOARTHRITIS OF LEFT KNEE: Primary | ICD-10-CM

## 2019-08-19 DIAGNOSIS — M17.0 PRIMARY OSTEOARTHRITIS OF BOTH KNEES: ICD-10-CM

## 2019-08-19 PROCEDURE — 99214 OFFICE O/P EST MOD 30 MIN: CPT | Mod: 25,S$PBB,, | Performed by: ORTHOPAEDIC SURGERY

## 2019-08-19 PROCEDURE — 99999 PR PBB SHADOW E&M-EST. PATIENT-LVL III: ICD-10-PCS | Mod: PBBFAC,,, | Performed by: ORTHOPAEDIC SURGERY

## 2019-08-19 PROCEDURE — 20610 DRAIN/INJ JOINT/BURSA W/O US: CPT | Mod: PBBFAC | Performed by: ORTHOPAEDIC SURGERY

## 2019-08-19 PROCEDURE — 99999 PR PBB SHADOW E&M-EST. PATIENT-LVL III: CPT | Mod: PBBFAC,,, | Performed by: ORTHOPAEDIC SURGERY

## 2019-08-19 PROCEDURE — 99214 PR OFFICE/OUTPT VISIT, EST, LEVL IV, 30-39 MIN: ICD-10-PCS | Mod: 25,S$PBB,, | Performed by: ORTHOPAEDIC SURGERY

## 2019-08-19 PROCEDURE — 99213 OFFICE O/P EST LOW 20 MIN: CPT | Mod: PBBFAC | Performed by: ORTHOPAEDIC SURGERY

## 2019-08-19 PROCEDURE — 20610 LARGE JOINT ASPIRATION/INJECTION: L KNEE: ICD-10-PCS | Mod: S$PBB,LT,, | Performed by: ORTHOPAEDIC SURGERY

## 2019-08-19 RX ORDER — TRIAMCINOLONE ACETONIDE 40 MG/ML
40 INJECTION, SUSPENSION INTRA-ARTICULAR; INTRAMUSCULAR
Status: DISCONTINUED | OUTPATIENT
Start: 2019-08-19 | End: 2019-08-19 | Stop reason: HOSPADM

## 2019-08-19 RX ADMIN — TRIAMCINOLONE ACETONIDE 40 MG: 40 INJECTION, SUSPENSION INTRA-ARTICULAR; INTRAMUSCULAR at 11:08

## 2019-08-19 NOTE — PROCEDURES
Large Joint Aspiration/Injection: L knee  Date/Time: 8/19/2019 11:21 AM  Performed by: Humberto Clifford III, MD  Authorized by: Humberto Clifford III, MD     Consent Done?:  Yes (Verbal)  Indications:  Pain  Timeout: Prior to procedure the correct patient, procedure, and site was verified    Anesthesia  Local anesthesia used  Anesthetic: lidocaine 1% without epinephrine  Anesthetic total: 4mL    Location:  Knee  Site:  L knee  Prep: Patient was prepped and draped in usual sterile fashion    Needle size:  21 G  Approach: anterior peripatellar.  Medications:  40 mg triamcinolone acetonide 40 mg/mL  Patient tolerance:  Patient tolerated the procedure well with no immediate complications

## 2019-08-19 NOTE — LETTER
August 19, 2019      Aguila Rogers PA-C  [address]  [facility] [address]           [facility] - Orthopedics  [address], [address]  [facility] [address]  Phone: [phone]          Patient: Tran Bradley   MR Number: [id number]   YOB: 1945   Date of Visit: 8/19/2019       Dear Aguila Rogers:    Thank you for referring Tran Bradley to me for evaluation. Attached you will find relevant portions of my assessment and plan of care.    If you have questions, please do not hesitate to call me. I look forward to following Tran Bradley along with you.    Sincerely,    Humberto Clifford III, MD    Enclosure  CC:  No Recipients    If you would like to receive this communication electronically, please contact [email or web address] or [phone] to request more information on [employer] Link access.    For providers and/or their staff who would like to refer a patient to [facility], please contact us through our one-stop-shop provider referral line, [facility], at [phone].    If you feel you have received this communication in error or would no longer like to receive these types of communications, please e-mail [email or web address]

## 2019-08-19 NOTE — PROGRESS NOTES
Subjective:     HPI:   Tran Bradley is a 73 y.o. female who presents for evaluation of bilateral knee arthritis referred by KATEY Chicas    Patient has had at least 5 years of bilateral knee pain. However her biggest issue is progressive difficulty walking increasing debility multiple falls and fear of falling  She has what she says is moderate activity-related knee pain is mostly on the left knee she points to her distal quad around the superior patella.  It is activity related and relieved with rest.  No groin anterior thigh radicular pain or paresthesias    She was given a prescription for Mobic recently but her daughter just picked up from the pharmacy they have not started it yet.  She has been on Voltaren gel in the past.  She has tried all kinds of topical things for her knees including turpentine.   She had bilateral knee injections last for December of 2018 which did help.  She has done physical therapy in the past and now does some home exercises.  She is warm 2-3 different types of braces.  At baseline she has a Rollator and a walker.  She has a very limited household ambulator she walks maybe 10-20 feet max with a walker mostly with assistance very rarely on her own really just in the house from bed to bathroom and kitchen.  Limitation is walking.  She is worried about falling    She lives with her daughter and son.  Unfortunately her son recently had a stroke.  Patient's sister is here with her today as well as what appears to be a demented  lady that the sister takes care of who was brought along to the appointment as well    Patient unable or refuses to stand for weight-bearing x-rays today    Patient ultimately says she does not want knee replacement surgery       ROS:  The updated medical history is in the chart and has been reviewed. A review of systems is updated and there is no reported vision changes, ear/nose/mouth/throat complaints,  chest pain, shortness of breath, abdominal pain,  urological complaints, fevers or chills, psychiatric complaints. Musculoskeletal and neurologcial symptoms are as documented. All other systems are negative.      Objective:   Exam:  There were no vitals filed for this visit.  Body mass index is 23.99 kg/m².    Physical examination included assessment of the patient's general appearance with particular attention to development, nutrition, body habitus, attention to grooming, and any evidence of distress.  Constitutional: The patient is a well-developed, well-nourished patient in no acute distress.   Cardiovascular: Vascular examination included warmth and capillary refill as well inspection for edema and assessment of pedal pulses. Pulses are palpable and regular.  Musculoskeletal: Gait was assessed as to whether it was steady, non-antalgic, and/or required the use of an assist device. The patient was also asked to walk independently and get onto the examination table.  Skin: The skin was examined for any obvious rashes or lesions in the extremity.  Neurologic: Sensation is intact to light touch in the extremity. The patient has good coordination without hyperreflexia and is alert and oriented to person, place and time and has normal mood and affect.     All of the above were examined and found to be within normal limits except for the following pertinent clinical findings:    She is sitting in a Rollator.  With maximum assistance I cannot get her to stand safely or to take any steps.  I can barely hold her up as she tries to stand I cannot get her to take any steps even when I am using both of my arms under her arms with all my force to hold her up right.  Both knees  degree knee range of motion 20° valgus alignment knee stable to anterior-posterior varus and valgus stresses significant flexion contractures but no extensor lag.  She has mild effusions in the knees really minimal tenderness to palpation medial lateral patellofemoral joint lines.  She does  actually have good strength with hip flexion knee flexion knee extension tib and EHL and gastroc psoas.       Imaging:  X-rays from August 9th these are nonweightbearing  Indication:  Left knee pain  Exam Ordered: Radiographs of the left knee include a anteroposterior view, , a lateral view in full flexion, and a sunrise view  Details of Examination:  exam shows evidence of joint space narrowing, osteophyte formation, and subchondral sclerosis, all consistent with degenerative arthritis of the knee.  No other significant findings are noted.  Impression:  Degenerative Arthritis, Left Knee    Indication:  Right knee pain  Exam Ordered: Radiographs of the right knee include a  anteroposterior view,  a lateral view in full flexion, and a sunrise view  Details of Examination:  exam shows evidence of joint space narrowing, osteophyte formation, and subchondral sclerosis, all consistent with degenerative arthritis of the knee.  No other significant findings are noted.  Impression:  Degenerative Arthritis, Right Knee        Assessment:     Primary osteoarthritis of left knee [M17.12]  Extreme debility, unable to stand and ambulate today even with maximum assistance    Urge continence     Plan:       The above findings were discussed with patient length. We discussed the risks of conservative versus surgical management knee arthritis. Conservative management consisting of anti-inflammatory medications, glucosamine/chondroitin sulfate, weight loss, physical therapy, activity modification, as well as injections (lubricant versus corticosteroid) was discussed at length. At this point considering the patient's level of activity, pain, and radiographic findings I recommend continued conservative management of the knee arthritis. Conservative management could involve treatment with anti-inflammatory medications.     I explained the potentially adverse gastric, cardiac, and renal effects of NSAIDs and explained that if the patient  wishes to take it for longer that the patient should discuss this with their primary care physician to determine if it is safe to do so.    Patient has severe arthritis on x-rays today.  However her knees themselves are not terribly symptomatic.  Her bigger issue is how debilitated that she is.  I can barely get her to stand I cannot get her to take any steps even using all my force to hold her up.  We discussed that at this point unfortunately I think the risk of a knee replacement would be very high and I cannot recommend surgery in a patient who is essentially nonambulatory at least on exam today    We will inject her left knee to see if it provides any significant relief.  We will order home health therapy to try to work on getting her stronger and getting her walking more.  I will give her information sheet about non operative arthritis management and knee home exercise program as well to start doing on her own.    We can see her back in 6 weeks without x-rays for repeat exam.  We discussed with her family that if she is able to stand and walk more and is more mobile at that point we could discuss the possibility of a knee replacement but a seems like she has been going downhill for many years and unfortunately I think we may have missed the window    Procedure:  The patient would like to proceed with a steroid injection into the knee.  We discussed the risks and benefits of cortisone injections.  After sterile preparation 1 cc of 40 mg of triamcinolone and 4 cc of 1% lidocaine was injected into the knee.  The patient tolerated the injection without any difficulty.  We discussed that this can be repeated every 3-4 months at the earliest.   Left knee    Orders Placed This Encounter   Procedures    Large Joint Aspiration/Injection: L knee     This order was created via procedure documentation    Ambulatory referral to Home Health     Referral Priority:   Routine     Referral Type:   Home Health     Referral  Reason:   Specialty Services Required     Requested Specialty:   Home Health Services     Number of Visits Requested:   1       Past Medical History:   Diagnosis Date    Anxiety     Arthritis     Depression     Hypertension        Past Surgical History:   Procedure Laterality Date    BUNIONECTOMY      bilateral        Family History   Problem Relation Age of Onset    Hypertension Mother     Heart disease Mother     Hypertension Father     Asthma Father     Heart disease Father     Hypertension Sister     COPD Brother     Heart disease Brother        Social History     Socioeconomic History    Marital status:      Spouse name: Not on file    Number of children: Not on file    Years of education: Not on file    Highest education level: Not on file   Occupational History    Not on file   Social Needs    Financial resource strain: Not on file    Food insecurity:     Worry: Not on file     Inability: Not on file    Transportation needs:     Medical: Not on file     Non-medical: Not on file   Tobacco Use    Smoking status: Never Smoker    Smokeless tobacco: Never Used   Substance and Sexual Activity    Alcohol use: No    Drug use: No    Sexual activity: Not on file   Lifestyle    Physical activity:     Days per week: Not on file     Minutes per session: Not on file    Stress: Not on file   Relationships    Social connections:     Talks on phone: Not on file     Gets together: Not on file     Attends [employer] service: Not on file     Active member of club or organization: Not on file     Attends meetings of clubs or organizations: Not on file     Relationship status: Not on file   Other Topics Concern    Not on file   Social History Narrative    Not on file

## 2019-08-21 ENCOUNTER — TELEPHONE (OUTPATIENT)
Dept: INTERNAL MEDICINE | Facility: CLINIC | Age: 74
End: 2019-08-21

## 2019-08-21 RX ORDER — SERTRALINE HYDROCHLORIDE 50 MG/1
50 TABLET, FILM COATED ORAL DAILY
Qty: 90 TABLET | Refills: 3 | Status: SHIPPED | OUTPATIENT
Start: 2019-08-21

## 2019-08-21 RX ORDER — ROSUVASTATIN CALCIUM 10 MG/1
10 TABLET, COATED ORAL DAILY
Qty: 90 TABLET | Refills: 3 | Status: SHIPPED | OUTPATIENT
Start: 2019-08-21 | End: 2019-09-20

## 2019-08-21 RX ORDER — AMLODIPINE BESYLATE 10 MG/1
10 TABLET ORAL DAILY
Qty: 90 TABLET | Refills: 3 | Status: SHIPPED | OUTPATIENT
Start: 2019-08-21

## 2019-08-21 NOTE — TELEPHONE ENCOUNTER
----- Message from Antione Peraza sent at 8/21/2019  2:50 PM CDT -----  Contact: [facility]/ Don [phone]  The patient's /100 when he got there.  She doesn't have all of her medications in the home.    Thank you

## 2019-08-21 NOTE — TELEPHONE ENCOUNTER
Spoke with home health  Refill sent  Social work consult and they will make sure she resumes her medications

## 2019-08-28 ENCOUNTER — TELEPHONE (OUTPATIENT)
Dept: ORTHOPEDICS | Facility: CLINIC | Age: 74
End: 2019-08-28

## 2019-08-28 NOTE — PROGRESS NOTES
Jose Juan for pt. I need to reschedule pt 9/30 follow up appt with Dr. Clifford. Dr. Clifford Sx days are now mondays and wednesdays.

## 2019-08-29 ENCOUNTER — TELEPHONE (OUTPATIENT)
Dept: ORTHOPEDICS | Facility: CLINIC | Age: 74
End: 2019-08-29

## 2019-08-29 NOTE — PROGRESS NOTES
Jose Juan for pt. I need to reschedule pt appt on 9/30 to another date Dr. Clifford has Sx on that date.

## 2019-09-04 DIAGNOSIS — M17.0 PRIMARY OSTEOARTHRITIS OF BOTH KNEES: Primary | ICD-10-CM

## 2019-09-11 ENCOUNTER — EXTERNAL HOME HEALTH (OUTPATIENT)
Dept: HOME HEALTH SERVICES | Facility: HOSPITAL | Age: 74
End: 2019-09-11

## 2019-10-01 ENCOUNTER — PATIENT OUTREACH (OUTPATIENT)
Dept: ADMINISTRATIVE | Facility: OTHER | Age: 74
End: 2019-10-01

## 2019-10-03 ENCOUNTER — TELEPHONE (OUTPATIENT)
Dept: ORTHOPEDICS | Facility: CLINIC | Age: 74
End: 2019-10-03

## 2020-09-04 DIAGNOSIS — Z12.11 COLON CANCER SCREENING: ICD-10-CM

## 2022-07-24 NOTE — LETTER
March 18, 2019      Jocelin Vargas MD  [address]  [facility] [address]           [facility] - Urology 4th Floor  [address]  [facility] [address]  Phone: [phone]          Patient: Tran Bradley   MR Number: [id number]   YOB: 1945   Date of Visit: 3/18/2019       Dear Dr. Jocelin Vargas:    Thank you for referring Tran Bradley to me for evaluation. Attached you will find relevant portions of my assessment and plan of care.    If you have questions, please do not hesitate to call me. I look forward to following Tran Bradley along with you.    Sincerely,    Yennifer Kimbrough MD    Enclosure  CC:  No Recipients    If you would like to receive this communication electronically, please contact [email or web address] or [phone] to request more information on [employer] Link access.    For providers and/or their staff who would like to refer a patient to [facility], please contact us through our one-stop-shop provider referral line, [facility], at [phone].    If you feel you have received this communication in error or would no longer like to receive these types of communications, please e-mail [email or web address]         
Yes...